# Patient Record
Sex: FEMALE | NOT HISPANIC OR LATINO
[De-identification: names, ages, dates, MRNs, and addresses within clinical notes are randomized per-mention and may not be internally consistent; named-entity substitution may affect disease eponyms.]

---

## 2017-01-18 ENCOUNTER — PRIOR ORIGINAL RECORDS (OUTPATIENT)
Dept: OTHER | Age: 67
End: 2017-01-18

## 2017-01-20 ENCOUNTER — PRIOR ORIGINAL RECORDS (OUTPATIENT)
Dept: OTHER | Age: 67
End: 2017-01-20

## 2017-01-23 ENCOUNTER — PRIOR ORIGINAL RECORDS (OUTPATIENT)
Dept: OTHER | Age: 67
End: 2017-01-23

## 2017-01-23 ENCOUNTER — LAB ENCOUNTER (OUTPATIENT)
Dept: LAB | Facility: HOSPITAL | Age: 67
End: 2017-01-23
Attending: INTERNAL MEDICINE
Payer: MEDICARE

## 2017-01-23 DIAGNOSIS — D69.6 THROMBOCYTOPENIA (HCC): ICD-10-CM

## 2017-01-23 DIAGNOSIS — C85.80 LARGE CELL LYMPHOMA (HCC): Primary | ICD-10-CM

## 2017-01-23 PROCEDURE — 88237 TISSUE CULTURE BONE MARROW: CPT

## 2017-01-23 PROCEDURE — 88271 CYTOGENETICS DNA PROBE: CPT

## 2017-01-23 PROCEDURE — 88313 SPECIAL STAINS GROUP 2: CPT

## 2017-01-23 PROCEDURE — 88275 CYTOGENETICS 100-300: CPT

## 2017-01-23 PROCEDURE — 88341 IMHCHEM/IMCYTCHM EA ADD ANTB: CPT

## 2017-01-23 PROCEDURE — 88305 TISSUE EXAM BY PATHOLOGIST: CPT

## 2017-01-23 PROCEDURE — 88291 CYTO/MOLECULAR REPORT: CPT

## 2017-01-23 PROCEDURE — 88342 IMHCHEM/IMCYTCHM 1ST ANTB: CPT

## 2017-01-23 PROCEDURE — 88184 FLOWCYTOMETRY/ TC 1 MARKER: CPT

## 2017-01-23 PROCEDURE — 88185 FLOWCYTOMETRY/TC ADD-ON: CPT

## 2017-01-23 PROCEDURE — 36415 COLL VENOUS BLD VENIPUNCTURE: CPT

## 2017-01-23 PROCEDURE — 88264 CHROMOSOME ANALYSIS 20-25: CPT

## 2017-01-23 PROCEDURE — 88311 DECALCIFY TISSUE: CPT

## 2017-01-23 PROCEDURE — 85097 BONE MARROW INTERPRETATION: CPT

## 2017-01-27 ENCOUNTER — PRIOR ORIGINAL RECORDS (OUTPATIENT)
Dept: OTHER | Age: 67
End: 2017-01-27

## 2017-01-30 ENCOUNTER — PRIOR ORIGINAL RECORDS (OUTPATIENT)
Dept: OTHER | Age: 67
End: 2017-01-30

## 2017-01-31 LAB
CD19+CD10+: 98.5 %
CD19+CD20+: 0.7 %
CD19+CD22+: 1 %
CD19+CD25+: <0.1 %
CD19+CD5+: <0.1 %
CD19+KAPPA+: 13.2 %
CD19+LAMBDA+: 6.9 %
CD3+CD2+: 99.9 %
CD3+CD4+: 42.8 %
CD3+CD4+CD8+: 1.8 %
CD3+CD5+: 94.6 %
CD3+CD7+: 79.1 %
CD3+CD8+: 35.3 %
CD4+:CD8+ RATIO: 1.2
KAPPA:LAMBDA RATIO: 1.91

## 2017-02-03 ENCOUNTER — PRIOR ORIGINAL RECORDS (OUTPATIENT)
Dept: OTHER | Age: 67
End: 2017-02-03

## 2017-02-03 PROBLEM — D69.3 IDIOPATHIC THROMBOCYTOPENIA PURPURA (HCC): Status: ACTIVE | Noted: 2017-02-03

## 2017-02-06 ENCOUNTER — OFFICE VISIT (OUTPATIENT)
Dept: HEMATOLOGY/ONCOLOGY | Facility: HOSPITAL | Age: 67
End: 2017-02-06
Attending: INTERNAL MEDICINE
Payer: MEDICARE

## 2017-02-06 VITALS
WEIGHT: 148.63 LBS | TEMPERATURE: 98 F | DIASTOLIC BLOOD PRESSURE: 65 MMHG | HEART RATE: 76 BPM | BODY MASS INDEX: 26 KG/M2 | SYSTOLIC BLOOD PRESSURE: 130 MMHG

## 2017-02-06 DIAGNOSIS — D69.3 IDIOPATHIC THROMBOCYTOPENIA PURPURA (HCC): Primary | ICD-10-CM

## 2017-02-06 PROCEDURE — 96366 THER/PROPH/DIAG IV INF ADDON: CPT

## 2017-02-06 PROCEDURE — 96365 THER/PROPH/DIAG IV INF INIT: CPT

## 2017-02-06 RX ORDER — ACETAMINOPHEN 325 MG/1
650 TABLET ORAL ONCE
Status: COMPLETED | OUTPATIENT
Start: 2017-02-06 | End: 2017-02-06

## 2017-02-06 RX ADMIN — ACETAMINOPHEN 650 MG: 325 TABLET ORAL at 10:07:00

## 2017-02-06 NOTE — PROGRESS NOTES
Education Record    Learner:  Patient    Disease / Carisa Honour    Barriers / Limitations:  None   Comments:    Method:  Discussion   Comments:    General Topics:  Plan of care reviewed   Comments:    Outcome:  Shows understanding   Comments:

## 2017-02-07 ENCOUNTER — OFFICE VISIT (OUTPATIENT)
Dept: HEMATOLOGY/ONCOLOGY | Facility: HOSPITAL | Age: 67
End: 2017-02-07
Attending: INTERNAL MEDICINE
Payer: MEDICARE

## 2017-02-07 VITALS
HEIGHT: 62.99 IN | BODY MASS INDEX: 26.29 KG/M2 | RESPIRATION RATE: 18 BRPM | HEART RATE: 86 BPM | OXYGEN SATURATION: 95 % | TEMPERATURE: 98 F | WEIGHT: 148.38 LBS | SYSTOLIC BLOOD PRESSURE: 116 MMHG | DIASTOLIC BLOOD PRESSURE: 60 MMHG

## 2017-02-07 DIAGNOSIS — D69.3 IDIOPATHIC THROMBOCYTOPENIA PURPURA (HCC): Primary | ICD-10-CM

## 2017-02-07 PROCEDURE — 96365 THER/PROPH/DIAG IV INF INIT: CPT

## 2017-02-07 PROCEDURE — 96366 THER/PROPH/DIAG IV INF ADDON: CPT

## 2017-02-07 RX ORDER — ACETAMINOPHEN 325 MG/1
650 TABLET ORAL ONCE
Status: COMPLETED | OUTPATIENT
Start: 2017-02-07 | End: 2017-02-07

## 2017-02-07 RX ADMIN — ACETAMINOPHEN 650 MG: 325 TABLET ORAL at 08:22:00

## 2017-02-07 NOTE — PROGRESS NOTES
Education Record    Learner:  Patient    Disease / Diagnosis:IVIG    Barriers / Limitations:  None   Comments:    Method:  Reinforcement   Comments:    General Topics:  Medication, Pain, Precautions, Procedure, Side effects and symptom management, Plan of

## 2017-02-10 ENCOUNTER — OFFICE VISIT (OUTPATIENT)
Dept: HEMATOLOGY/ONCOLOGY | Facility: HOSPITAL | Age: 67
End: 2017-02-10
Attending: INTERNAL MEDICINE
Payer: MEDICARE

## 2017-02-10 ENCOUNTER — PRIOR ORIGINAL RECORDS (OUTPATIENT)
Dept: OTHER | Age: 67
End: 2017-02-10

## 2017-02-10 VITALS
HEART RATE: 71 BPM | BODY MASS INDEX: 26.19 KG/M2 | RESPIRATION RATE: 20 BRPM | HEIGHT: 62.99 IN | DIASTOLIC BLOOD PRESSURE: 63 MMHG | OXYGEN SATURATION: 99 % | WEIGHT: 147.81 LBS | TEMPERATURE: 98 F | SYSTOLIC BLOOD PRESSURE: 118 MMHG

## 2017-02-10 DIAGNOSIS — D69.6 THROMBOCYTOPENIA (HCC): Primary | ICD-10-CM

## 2017-02-10 DIAGNOSIS — C83.08 SMALL CELL B-CELL LYMPHOMA OF LYMPH NODES OF MULTIPLE SITES (HCC): ICD-10-CM

## 2017-02-10 LAB
ANTIBODY SCREEN: NEGATIVE
PLATELET # BLD AUTO: 94 10(3)UL (ref 150–450)
RH BLOOD TYPE: POSITIVE

## 2017-02-10 PROCEDURE — 36430 TRANSFUSION BLD/BLD COMPNT: CPT

## 2017-02-10 PROCEDURE — 85049 AUTOMATED PLATELET COUNT: CPT

## 2017-02-10 PROCEDURE — 86900 BLOOD TYPING SEROLOGIC ABO: CPT

## 2017-02-10 PROCEDURE — 86901 BLOOD TYPING SEROLOGIC RH(D): CPT

## 2017-02-10 PROCEDURE — 86850 RBC ANTIBODY SCREEN: CPT

## 2017-02-10 RX ORDER — DIPHENHYDRAMINE HCL 25 MG
25 CAPSULE ORAL ONCE
Status: COMPLETED | OUTPATIENT
Start: 2017-02-10 | End: 2017-02-10

## 2017-02-10 RX ORDER — ACETAMINOPHEN 325 MG/1
650 TABLET ORAL ONCE
Status: COMPLETED | OUTPATIENT
Start: 2017-02-10 | End: 2017-02-10

## 2017-02-10 RX ADMIN — ACETAMINOPHEN 650 MG: 325 TABLET ORAL at 12:11:00

## 2017-02-10 RX ADMIN — DIPHENHYDRAMINE HCL 25 MG: 25 MG CAPSULE ORAL at 12:11:00

## 2017-02-10 NOTE — PROGRESS NOTES
Education Record    Learner:  Patient    Disease / Diagnosis:  Thrombocytopenia    Barriers / Limitations:  None   Comments:    Method:  Discussion   Comments:    General Topics:  Medication, Precautions, Procedure and Plan of care reviewed   Comments:    O

## 2017-02-11 LAB — BLOOD TYPE BARCODE: 6200

## 2017-02-13 ENCOUNTER — PRIOR ORIGINAL RECORDS (OUTPATIENT)
Dept: OTHER | Age: 67
End: 2017-02-13

## 2017-02-17 ENCOUNTER — PRIOR ORIGINAL RECORDS (OUTPATIENT)
Dept: OTHER | Age: 67
End: 2017-02-17

## 2017-02-24 ENCOUNTER — PRIOR ORIGINAL RECORDS (OUTPATIENT)
Dept: OTHER | Age: 67
End: 2017-02-24

## 2017-02-24 ENCOUNTER — OFFICE VISIT (OUTPATIENT)
Dept: HEMATOLOGY/ONCOLOGY | Facility: HOSPITAL | Age: 67
End: 2017-02-24
Attending: INTERNAL MEDICINE
Payer: MEDICARE

## 2017-02-24 VITALS
RESPIRATION RATE: 18 BRPM | DIASTOLIC BLOOD PRESSURE: 59 MMHG | HEART RATE: 71 BPM | TEMPERATURE: 98 F | SYSTOLIC BLOOD PRESSURE: 120 MMHG

## 2017-02-24 DIAGNOSIS — C83.08 SMALL CELL B-CELL LYMPHOMA OF LYMPH NODES OF MULTIPLE SITES (HCC): ICD-10-CM

## 2017-02-24 DIAGNOSIS — C85.88 LYMPHOSARCOMA OF LYMPH NODES OF MULTIPLE SITES (HCC): ICD-10-CM

## 2017-02-24 DIAGNOSIS — D69.6 THROMBOCYTOPENIA (HCC): Primary | ICD-10-CM

## 2017-02-24 LAB
ANTIBODY SCREEN: NEGATIVE
RH BLOOD TYPE: POSITIVE

## 2017-02-24 PROCEDURE — 86901 BLOOD TYPING SEROLOGIC RH(D): CPT

## 2017-02-24 PROCEDURE — 86900 BLOOD TYPING SEROLOGIC ABO: CPT

## 2017-02-24 PROCEDURE — 86850 RBC ANTIBODY SCREEN: CPT

## 2017-02-24 PROCEDURE — 36430 TRANSFUSION BLD/BLD COMPNT: CPT

## 2017-02-24 RX ORDER — GARLIC EXTRACT 500 MG
1 CAPSULE ORAL DAILY
COMMUNITY
End: 2017-05-10

## 2017-02-24 RX ORDER — DIPHENHYDRAMINE HCL 25 MG
25 CAPSULE ORAL ONCE
Status: COMPLETED | OUTPATIENT
Start: 2017-02-24 | End: 2017-02-24

## 2017-02-24 RX ORDER — ACETAMINOPHEN 325 MG/1
650 TABLET ORAL ONCE
Status: COMPLETED | OUTPATIENT
Start: 2017-02-24 | End: 2017-02-24

## 2017-02-24 RX ORDER — DOXEPIN HYDROCHLORIDE 50 MG/1
1 CAPSULE ORAL DAILY
COMMUNITY
End: 2017-06-29

## 2017-02-24 RX ADMIN — DIPHENHYDRAMINE HCL 25 MG: 25 MG CAPSULE ORAL at 11:41:00

## 2017-02-24 RX ADMIN — ACETAMINOPHEN 650 MG: 325 TABLET ORAL at 11:41:00

## 2017-02-24 NOTE — PROGRESS NOTES
Education Record    Learner:  Patient    Disease / Diagnosis: 1 unit platelets    Barriers / Limitations:  None   Comments:    Method:  Discussion   Comments:    General Topics:  Side effects and symptom management and Plan of care reviewed   Comments:

## 2017-02-25 LAB — BLOOD TYPE BARCODE: 6200

## 2017-02-28 ENCOUNTER — PRIOR ORIGINAL RECORDS (OUTPATIENT)
Dept: OTHER | Age: 67
End: 2017-02-28

## 2017-03-03 ENCOUNTER — PRIOR ORIGINAL RECORDS (OUTPATIENT)
Dept: OTHER | Age: 67
End: 2017-03-03

## 2017-03-10 ENCOUNTER — PRIOR ORIGINAL RECORDS (OUTPATIENT)
Dept: OTHER | Age: 67
End: 2017-03-10

## 2017-03-15 ENCOUNTER — PRIOR ORIGINAL RECORDS (OUTPATIENT)
Dept: OTHER | Age: 67
End: 2017-03-15

## 2017-03-24 ENCOUNTER — PRIOR ORIGINAL RECORDS (OUTPATIENT)
Dept: OTHER | Age: 67
End: 2017-03-24

## 2017-03-31 ENCOUNTER — PRIOR ORIGINAL RECORDS (OUTPATIENT)
Dept: OTHER | Age: 67
End: 2017-03-31

## 2017-04-06 ENCOUNTER — HOSPITAL ENCOUNTER (OUTPATIENT)
Dept: MAMMOGRAPHY | Age: 67
Discharge: HOME | End: 2017-04-06
Attending: INTERNAL MEDICINE
Payer: MEDICARE

## 2017-04-06 DIAGNOSIS — Z12.31 ENCOUNTER FOR SCREENING MAMMOGRAM FOR MALIGNANT NEOPLASM OF BREAST: ICD-10-CM

## 2017-04-06 DIAGNOSIS — Z12.31 VISIT FOR SCREENING MAMMOGRAM: ICD-10-CM

## 2017-04-06 PROCEDURE — 77067 SCR MAMMO BI INCL CAD: CPT

## 2017-04-07 ENCOUNTER — PRIOR ORIGINAL RECORDS (OUTPATIENT)
Dept: OTHER | Age: 67
End: 2017-04-07

## 2017-04-12 ENCOUNTER — PRIOR ORIGINAL RECORDS (OUTPATIENT)
Dept: OTHER | Age: 67
End: 2017-04-12

## 2017-04-13 ENCOUNTER — HOSPITAL ENCOUNTER (OUTPATIENT)
Dept: MAMMOGRAPHY | Facility: HOSPITAL | Age: 67
Discharge: HOME | End: 2017-04-13
Attending: INTERNAL MEDICINE
Payer: MEDICARE

## 2017-04-13 DIAGNOSIS — R92.2 INCONCLUSIVE MAMMOGRAM: ICD-10-CM

## 2017-04-13 PROCEDURE — 77065 DX MAMMO INCL CAD UNI: CPT

## 2017-04-13 PROCEDURE — 77061 BREAST TOMOSYNTHESIS UNI: CPT

## 2017-04-13 PROCEDURE — 76642 ULTRASOUND BREAST LIMITED: CPT

## 2017-04-13 NOTE — IMAGING NOTE
Asssisted Dr. Juan Luis Freitas with recommendation for a left retroareolar US breast biopsy for nodule. Emotional and educational suport provided. Written information provided to Angi Loya.  Our breast center schedulers will call pt within 72 hours to

## 2017-04-14 ENCOUNTER — TELEPHONE (OUTPATIENT)
Dept: MAMMOGRAPHY | Facility: HOSPITAL | Age: 67
End: 2017-04-14

## 2017-04-18 ENCOUNTER — PRIOR ORIGINAL RECORDS (OUTPATIENT)
Dept: OTHER | Age: 67
End: 2017-04-18

## 2017-04-20 ENCOUNTER — HOSPITAL ENCOUNTER (OUTPATIENT)
Dept: MAMMOGRAPHY | Facility: HOSPITAL | Age: 67
Discharge: HOME | End: 2017-04-20
Attending: INTERNAL MEDICINE
Payer: MEDICARE

## 2017-04-20 DIAGNOSIS — N63.0 BREAST NODULE: ICD-10-CM

## 2017-04-20 PROCEDURE — 77065 DX MAMMO INCL CAD UNI: CPT

## 2017-04-20 PROCEDURE — 19083 BX BREAST 1ST LESION US IMAG: CPT

## 2017-04-20 PROCEDURE — 88305 TISSUE EXAM BY PATHOLOGIST: CPT | Performed by: INTERNAL MEDICINE

## 2017-04-20 RX ORDER — ACETAMINOPHEN 500 MG
1000 TABLET ORAL ONCE
Status: COMPLETED | OUTPATIENT
Start: 2017-04-20 | End: 2017-04-20

## 2017-04-20 RX ORDER — ACETAMINOPHEN 500 MG
TABLET ORAL
Status: COMPLETED
Start: 2017-04-20 | End: 2017-04-20

## 2017-04-20 RX ADMIN — ACETAMINOPHEN 1000 MG: 500 MG TABLET ORAL at 13:15:00

## 2017-04-20 NOTE — PROCEDURES
PROCEDURE: ULTRASOUND GUIDED BIOPSY OF THE LEFT BREAST, ONE SITE    COMPARISON: US EULA BREAST LEFT LIMITED ([facility] SAME DAY US)(CPT=76642), 4/13/2017, 14:02.     INDICATIONS: N63 Unspecified lump in breast, evaluate mass in the left breast are biopsy

## 2017-04-20 NOTE — IMAGING NOTE
Procedure explained throughout and all questions answered. Consent and discharge paperwork signed by Luna Mahoney. Left breast positioned. Assisted Dr. Bobby Mclean with US guided biopsy.  Pt tolerated well. Pressure held on biopsy site for 10 min.  April Rendon

## 2017-04-24 ENCOUNTER — TELEPHONE (OUTPATIENT)
Dept: MAMMOGRAPHY | Facility: HOSPITAL | Age: 67
End: 2017-04-24

## 2017-05-05 ENCOUNTER — PRIOR ORIGINAL RECORDS (OUTPATIENT)
Dept: OTHER | Age: 67
End: 2017-05-05

## 2017-05-19 ENCOUNTER — PRIOR ORIGINAL RECORDS (OUTPATIENT)
Dept: OTHER | Age: 67
End: 2017-05-19

## 2017-06-10 ENCOUNTER — APPOINTMENT (OUTPATIENT)
Dept: GENERAL RADIOLOGY | Facility: HOSPITAL | Age: 67
End: 2017-06-10
Attending: EMERGENCY MEDICINE
Payer: MEDICARE

## 2017-06-10 ENCOUNTER — HOSPITAL ENCOUNTER (EMERGENCY)
Facility: HOSPITAL | Age: 67
Discharge: HOME | End: 2017-06-10
Attending: EMERGENCY MEDICINE
Payer: MEDICARE

## 2017-06-10 ENCOUNTER — APPOINTMENT (OUTPATIENT)
Dept: ULTRASOUND IMAGING | Facility: HOSPITAL | Age: 67
End: 2017-06-10
Attending: EMERGENCY MEDICINE
Payer: MEDICARE

## 2017-06-10 VITALS
HEART RATE: 74 BPM | OXYGEN SATURATION: 100 % | DIASTOLIC BLOOD PRESSURE: 82 MMHG | BODY MASS INDEX: 29.44 KG/M2 | HEIGHT: 62 IN | RESPIRATION RATE: 16 BRPM | TEMPERATURE: 98 F | WEIGHT: 160 LBS | SYSTOLIC BLOOD PRESSURE: 125 MMHG

## 2017-06-10 DIAGNOSIS — D46.9 MYELODYSPLASIA (MYELODYSPLASTIC SYNDROME) (HCC): ICD-10-CM

## 2017-06-10 DIAGNOSIS — D69.6 THROMBOCYTOPENIA (HCC): Primary | ICD-10-CM

## 2017-06-10 PROCEDURE — 36415 COLL VENOUS BLD VENIPUNCTURE: CPT

## 2017-06-10 PROCEDURE — 80053 COMPREHEN METABOLIC PANEL: CPT | Performed by: EMERGENCY MEDICINE

## 2017-06-10 PROCEDURE — 71020 XR CHEST PA + LAT CHEST (CPT=71020): CPT | Performed by: EMERGENCY MEDICINE

## 2017-06-10 PROCEDURE — 99284 EMERGENCY DEPT VISIT MOD MDM: CPT

## 2017-06-10 PROCEDURE — 81003 URINALYSIS AUTO W/O SCOPE: CPT | Performed by: EMERGENCY MEDICINE

## 2017-06-10 PROCEDURE — 99285 EMERGENCY DEPT VISIT HI MDM: CPT

## 2017-06-10 PROCEDURE — 86901 BLOOD TYPING SEROLOGIC RH(D): CPT | Performed by: EMERGENCY MEDICINE

## 2017-06-10 PROCEDURE — 93970 EXTREMITY STUDY: CPT | Performed by: EMERGENCY MEDICINE

## 2017-06-10 PROCEDURE — 86850 RBC ANTIBODY SCREEN: CPT | Performed by: EMERGENCY MEDICINE

## 2017-06-10 PROCEDURE — 85025 COMPLETE CBC W/AUTO DIFF WBC: CPT | Performed by: EMERGENCY MEDICINE

## 2017-06-10 PROCEDURE — 86900 BLOOD TYPING SEROLOGIC ABO: CPT | Performed by: EMERGENCY MEDICINE

## 2017-06-10 PROCEDURE — 87040 BLOOD CULTURE FOR BACTERIA: CPT | Performed by: EMERGENCY MEDICINE

## 2017-06-10 PROCEDURE — 36430 TRANSFUSION BLD/BLD COMPNT: CPT

## 2017-06-10 NOTE — ED NOTES
Ok to give hep lock flush per Dr. Daniela Reyna. Port a cath deaccessed after 10ml saline flush and 500unit heparin lock flush.

## 2017-06-10 NOTE — ED PROVIDER NOTES
Patient Seen in: [facility] Emergency Department    History   Patient presents with:  Rash Skin Problem (integumentary)    Stated Complaint: wound not healing    HPI    Patient presents with swelling and redness of her right ankle region for the pas needed for Wheezing. acyclovir 200 MG Oral Cap,  Take 200 mg by mouth 2 (two) times daily.          Family History   Problem Relation Age of Onset   • alzheimer's disease[other] [OTHER] Mother    • Cancer Father      lung   • Stroke Maternal Grandmother adenopathy. Neurological: She is alert and oriented to person, place, and time. She has normal strength. No sensory deficit. Skin: Skin is warm and dry.    There is milde generalized edema in the area of the patient's right ankle along with some mild te BLOOD TYPE, ABO AND RH C[[id number]]                         Final result               ANTIBODY SCREEN[[id number]]                                  Final result                 Please view results for these tests on the individual orders.    BLOOD TYPE, AB

## 2017-06-15 ENCOUNTER — PRIOR ORIGINAL RECORDS (OUTPATIENT)
Dept: OTHER | Age: 67
End: 2017-06-15

## 2017-06-15 ENCOUNTER — LAB ENCOUNTER (OUTPATIENT)
Dept: LAB | Facility: HOSPITAL | Age: 67
End: 2017-06-15
Attending: INTERNAL MEDICINE
Payer: MEDICARE

## 2017-06-15 DIAGNOSIS — C83.08 SMALL CELL B-CELL LYMPHOMA OF LYMPH NODES OF MULTIPLE SITES (HCC): Primary | ICD-10-CM

## 2017-06-15 PROCEDURE — 88311 DECALCIFY TISSUE: CPT

## 2017-06-15 PROCEDURE — 88184 FLOWCYTOMETRY/ TC 1 MARKER: CPT

## 2017-06-15 PROCEDURE — 88313 SPECIAL STAINS GROUP 2: CPT

## 2017-06-15 PROCEDURE — 85097 BONE MARROW INTERPRETATION: CPT

## 2017-06-15 PROCEDURE — 88185 FLOWCYTOMETRY/TC ADD-ON: CPT

## 2017-06-15 PROCEDURE — 88342 IMHCHEM/IMCYTCHM 1ST ANTB: CPT

## 2017-06-15 PROCEDURE — 88264 CHROMOSOME ANALYSIS 20-25: CPT

## 2017-06-15 PROCEDURE — 88305 TISSUE EXAM BY PATHOLOGIST: CPT

## 2017-06-15 PROCEDURE — 36415 COLL VENOUS BLD VENIPUNCTURE: CPT

## 2017-06-15 PROCEDURE — 88341 IMHCHEM/IMCYTCHM EA ADD ANTB: CPT

## 2017-06-15 PROCEDURE — 88237 TISSUE CULTURE BONE MARROW: CPT

## 2017-06-16 PROCEDURE — 88175 CYTOPATH C/V AUTO FLUID REDO: CPT | Performed by: INTERNAL MEDICINE

## 2017-06-19 ENCOUNTER — PRIOR ORIGINAL RECORDS (OUTPATIENT)
Dept: OTHER | Age: 67
End: 2017-06-19

## 2017-06-19 PROBLEM — M41.25 OTHER IDIOPATHIC SCOLIOSIS, THORACOLUMBAR REGION: Status: ACTIVE | Noted: 2017-06-19

## 2017-06-20 ENCOUNTER — OFFICE VISIT (OUTPATIENT)
Dept: HEMATOLOGY/ONCOLOGY | Facility: HOSPITAL | Age: 67
End: 2017-06-20
Attending: INTERNAL MEDICINE
Payer: MEDICARE

## 2017-06-20 VITALS
RESPIRATION RATE: 18 BRPM | WEIGHT: 157.5 LBS | TEMPERATURE: 98 F | BODY MASS INDEX: 27.91 KG/M2 | DIASTOLIC BLOOD PRESSURE: 65 MMHG | HEART RATE: 76 BPM | HEIGHT: 62.99 IN | SYSTOLIC BLOOD PRESSURE: 114 MMHG | OXYGEN SATURATION: 99 %

## 2017-06-20 DIAGNOSIS — Z51.11 ENCOUNTER FOR ANTINEOPLASTIC CHEMOTHERAPY: ICD-10-CM

## 2017-06-20 DIAGNOSIS — D64.89 ANEMIA DUE TO OTHER CAUSE: ICD-10-CM

## 2017-06-20 DIAGNOSIS — D69.3 IDIOPATHIC THROMBOCYTOPENIA PURPURA (HCC): ICD-10-CM

## 2017-06-20 DIAGNOSIS — C83.08 SMALL CELL B-CELL LYMPHOMA OF LYMPH NODES OF MULTIPLE SITES (HCC): ICD-10-CM

## 2017-06-20 DIAGNOSIS — C85.88 LYMPHOSARCOMA OF LYMPH NODES OF MULTIPLE SITES (HCC): ICD-10-CM

## 2017-06-20 DIAGNOSIS — D69.6 THROMBOCYTOPENIA (HCC): Primary | ICD-10-CM

## 2017-06-20 PROCEDURE — 36430 TRANSFUSION BLD/BLD COMPNT: CPT

## 2017-06-20 PROCEDURE — 86850 RBC ANTIBODY SCREEN: CPT

## 2017-06-20 PROCEDURE — 86900 BLOOD TYPING SEROLOGIC ABO: CPT

## 2017-06-20 PROCEDURE — 36415 COLL VENOUS BLD VENIPUNCTURE: CPT

## 2017-06-20 PROCEDURE — 86901 BLOOD TYPING SEROLOGIC RH(D): CPT

## 2017-06-20 RX ORDER — SODIUM CHLORIDE 0.9 % (FLUSH) 0.9 %
10 SYRINGE (ML) INJECTION ONCE
Status: COMPLETED | OUTPATIENT
Start: 2017-06-20 | End: 2017-06-20

## 2017-06-20 RX ORDER — ACETAMINOPHEN 325 MG/1
650 TABLET ORAL ONCE
Status: COMPLETED | OUTPATIENT
Start: 2017-06-20 | End: 2017-06-20

## 2017-06-20 RX ORDER — DIPHENHYDRAMINE HCL 25 MG
25 CAPSULE ORAL ONCE
Status: COMPLETED | OUTPATIENT
Start: 2017-06-20 | End: 2017-06-20

## 2017-06-20 RX ADMIN — DIPHENHYDRAMINE HCL 25 MG: 25 MG CAPSULE ORAL at 14:10:00

## 2017-06-20 RX ADMIN — SODIUM CHLORIDE 0.9 % (FLUSH) 10 ML: 0.9 % SYRINGE (ML) INJECTION at 15:35:00

## 2017-06-20 RX ADMIN — ACETAMINOPHEN 650 MG: 325 TABLET ORAL at 14:10:00

## 2017-06-20 NOTE — PROGRESS NOTES
Pt arrived for plt transfusion, pt states sees Dr Jose Mckeon and was called with low plt and told to come to cancer center for transfusion of 1 unit, pt alert and appears in nad, pt ambulates with steady gait, post transfusion instructions with MD phone number

## 2017-06-23 ENCOUNTER — PRIOR ORIGINAL RECORDS (OUTPATIENT)
Dept: OTHER | Age: 67
End: 2017-06-23

## 2017-06-26 ENCOUNTER — PRIOR ORIGINAL RECORDS (OUTPATIENT)
Dept: OTHER | Age: 67
End: 2017-06-26

## 2017-06-27 ENCOUNTER — PRIOR ORIGINAL RECORDS (OUTPATIENT)
Dept: OTHER | Age: 67
End: 2017-06-27

## 2017-06-28 ENCOUNTER — NURSE ONLY (OUTPATIENT)
Dept: HEMATOLOGY/ONCOLOGY | Facility: HOSPITAL | Age: 67
End: 2017-06-28
Attending: INTERNAL MEDICINE
Payer: MEDICARE

## 2017-06-28 ENCOUNTER — PRIOR ORIGINAL RECORDS (OUTPATIENT)
Dept: OTHER | Age: 67
End: 2017-06-28

## 2017-06-28 DIAGNOSIS — D64.89 ANEMIA DUE TO OTHER CAUSE: ICD-10-CM

## 2017-06-28 DIAGNOSIS — Z51.11 ENCOUNTER FOR ANTINEOPLASTIC CHEMOTHERAPY: ICD-10-CM

## 2017-06-28 DIAGNOSIS — C83.08 SMALL CELL B-CELL LYMPHOMA OF LYMPH NODES OF MULTIPLE SITES (HCC): ICD-10-CM

## 2017-06-28 DIAGNOSIS — C85.88 LYMPHOSARCOMA OF LYMPH NODES OF MULTIPLE SITES (HCC): ICD-10-CM

## 2017-06-28 DIAGNOSIS — D69.6 THROMBOCYTOPENIA (HCC): ICD-10-CM

## 2017-06-28 DIAGNOSIS — D69.6 THROMBOCYTOPENIA (HCC): Primary | ICD-10-CM

## 2017-06-28 PROCEDURE — 86901 BLOOD TYPING SEROLOGIC RH(D): CPT

## 2017-06-28 PROCEDURE — 86850 RBC ANTIBODY SCREEN: CPT

## 2017-06-28 PROCEDURE — 86900 BLOOD TYPING SEROLOGIC ABO: CPT

## 2017-06-28 PROCEDURE — 36415 COLL VENOUS BLD VENIPUNCTURE: CPT

## 2017-06-28 RX ORDER — ACETAMINOPHEN 325 MG/1
650 TABLET ORAL ONCE
Status: CANCELLED | OUTPATIENT
Start: 2017-06-28

## 2017-06-28 RX ORDER — DIPHENHYDRAMINE HCL 25 MG
25 CAPSULE ORAL ONCE
Status: CANCELLED | OUTPATIENT
Start: 2017-06-28

## 2017-06-29 ENCOUNTER — OFFICE VISIT (OUTPATIENT)
Dept: HEMATOLOGY/ONCOLOGY | Facility: HOSPITAL | Age: 67
End: 2017-06-29
Attending: INTERNAL MEDICINE
Payer: MEDICARE

## 2017-06-29 VITALS
HEIGHT: 62.99 IN | BODY MASS INDEX: 28 KG/M2 | OXYGEN SATURATION: 96 % | TEMPERATURE: 99 F | DIASTOLIC BLOOD PRESSURE: 56 MMHG | HEART RATE: 75 BPM | SYSTOLIC BLOOD PRESSURE: 98 MMHG | WEIGHT: 158 LBS | RESPIRATION RATE: 18 BRPM

## 2017-06-29 DIAGNOSIS — C85.88 LYMPHOSARCOMA OF LYMPH NODES OF MULTIPLE SITES (HCC): ICD-10-CM

## 2017-06-29 DIAGNOSIS — D69.6 THROMBOCYTOPENIA (HCC): Primary | ICD-10-CM

## 2017-06-29 DIAGNOSIS — D69.3 IDIOPATHIC THROMBOCYTOPENIA PURPURA (HCC): ICD-10-CM

## 2017-06-29 DIAGNOSIS — D64.89 ANEMIA DUE TO OTHER CAUSE: ICD-10-CM

## 2017-06-29 DIAGNOSIS — Z51.11 ENCOUNTER FOR ANTINEOPLASTIC CHEMOTHERAPY: ICD-10-CM

## 2017-06-29 DIAGNOSIS — C83.08 SMALL CELL B-CELL LYMPHOMA OF LYMPH NODES OF MULTIPLE SITES (HCC): ICD-10-CM

## 2017-06-29 PROCEDURE — 36430 TRANSFUSION BLD/BLD COMPNT: CPT

## 2017-06-29 RX ORDER — SODIUM CHLORIDE 0.9 % (FLUSH) 0.9 %
10 SYRINGE (ML) INJECTION ONCE
Status: CANCELLED | OUTPATIENT
Start: 2017-06-29

## 2017-06-29 RX ORDER — ACETAMINOPHEN 325 MG/1
650 TABLET ORAL ONCE
Status: COMPLETED | OUTPATIENT
Start: 2017-06-29 | End: 2017-06-29

## 2017-06-29 RX ORDER — ACETAMINOPHEN 325 MG/1
650 TABLET ORAL ONCE
Status: CANCELLED | OUTPATIENT
Start: 2017-06-29

## 2017-06-29 RX ORDER — SODIUM CHLORIDE 0.9 % (FLUSH) 0.9 %
10 SYRINGE (ML) INJECTION ONCE
Status: COMPLETED | OUTPATIENT
Start: 2017-06-29 | End: 2017-06-29

## 2017-06-29 RX ORDER — DIPHENHYDRAMINE HCL 25 MG
25 CAPSULE ORAL ONCE
Status: CANCELLED | OUTPATIENT
Start: 2017-06-29

## 2017-06-29 RX ORDER — DIPHENHYDRAMINE HYDROCHLORIDE 50 MG/ML
25 INJECTION INTRAMUSCULAR; INTRAVENOUS AS NEEDED
Status: CANCELLED | OUTPATIENT
Start: 2017-06-29

## 2017-06-29 RX ORDER — DIPHENHYDRAMINE HYDROCHLORIDE 50 MG/ML
50 INJECTION INTRAMUSCULAR; INTRAVENOUS AS NEEDED
Status: CANCELLED | OUTPATIENT
Start: 2017-06-29

## 2017-06-29 RX ORDER — ONDANSETRON HYDROCHLORIDE 8 MG/1
8 TABLET, FILM COATED ORAL EVERY 8 HOURS PRN
COMMUNITY

## 2017-06-29 RX ORDER — DIPHENHYDRAMINE HCL 25 MG
25 CAPSULE ORAL ONCE
Status: COMPLETED | OUTPATIENT
Start: 2017-06-29 | End: 2017-06-29

## 2017-06-29 RX ADMIN — SODIUM CHLORIDE 0.9 % (FLUSH) 10 ML: 0.9 % SYRINGE (ML) INJECTION at 15:25:00

## 2017-06-29 RX ADMIN — ACETAMINOPHEN 650 MG: 325 TABLET ORAL at 14:05:00

## 2017-06-29 RX ADMIN — DIPHENHYDRAMINE HCL 25 MG: 25 MG CAPSULE ORAL at 14:05:00

## 2017-06-29 NOTE — PATIENT INSTRUCTIONS
YOU RECEIVED 1 UNIT OF PLATELETS FOR A PLATELET COUNT OF [phone], PER 'S ORDERS. Post Transfusion Instructions for Out-Patients    Most recipients of blood transfusions do not experience any adverse effects.   You may resume your normal activities

## 2017-06-30 ENCOUNTER — PRIOR ORIGINAL RECORDS (OUTPATIENT)
Dept: OTHER | Age: 67
End: 2017-06-30

## 2017-07-03 ENCOUNTER — PRIOR ORIGINAL RECORDS (OUTPATIENT)
Dept: OTHER | Age: 67
End: 2017-07-03

## 2017-07-06 ENCOUNTER — NURSE ONLY (OUTPATIENT)
Dept: HEMATOLOGY/ONCOLOGY | Facility: HOSPITAL | Age: 67
End: 2017-07-06
Attending: INTERNAL MEDICINE
Payer: MEDICARE

## 2017-07-06 ENCOUNTER — PRIOR ORIGINAL RECORDS (OUTPATIENT)
Dept: OTHER | Age: 67
End: 2017-07-06

## 2017-07-06 VITALS
TEMPERATURE: 99 F | HEART RATE: 80 BPM | DIASTOLIC BLOOD PRESSURE: 73 MMHG | OXYGEN SATURATION: 96 % | SYSTOLIC BLOOD PRESSURE: 122 MMHG | RESPIRATION RATE: 20 BRPM

## 2017-07-06 DIAGNOSIS — C83.08 SMALL CELL B-CELL LYMPHOMA OF LYMPH NODES OF MULTIPLE SITES (HCC): ICD-10-CM

## 2017-07-06 DIAGNOSIS — Z51.11 ENCOUNTER FOR ANTINEOPLASTIC CHEMOTHERAPY: ICD-10-CM

## 2017-07-06 DIAGNOSIS — D69.6 THROMBOCYTOPENIA (HCC): Primary | ICD-10-CM

## 2017-07-06 PROBLEM — R29.898 WEAKNESS OF BOTH LEGS: Status: ACTIVE | Noted: 2017-07-06

## 2017-07-06 PROBLEM — M54.16 LUMBAR RADICULITIS: Status: ACTIVE | Noted: 2017-07-06

## 2017-07-06 LAB
ANTIBODY SCREEN: NEGATIVE
RH BLOOD TYPE: POSITIVE

## 2017-07-06 PROCEDURE — 86901 BLOOD TYPING SEROLOGIC RH(D): CPT

## 2017-07-06 PROCEDURE — 86850 RBC ANTIBODY SCREEN: CPT

## 2017-07-06 PROCEDURE — 86900 BLOOD TYPING SEROLOGIC ABO: CPT

## 2017-07-06 PROCEDURE — 36430 TRANSFUSION BLD/BLD COMPNT: CPT

## 2017-07-06 RX ORDER — ACETAMINOPHEN 325 MG/1
650 TABLET ORAL ONCE
Status: CANCELLED | OUTPATIENT
Start: 2017-07-06

## 2017-07-06 RX ORDER — ACETAMINOPHEN 325 MG/1
650 TABLET ORAL ONCE
Status: DISCONTINUED | OUTPATIENT
Start: 2017-07-06 | End: 2017-07-06

## 2017-07-06 RX ORDER — DIPHENHYDRAMINE HCL 25 MG
25 CAPSULE ORAL ONCE
Status: CANCELLED | OUTPATIENT
Start: 2017-07-06

## 2017-07-06 RX ORDER — DIPHENHYDRAMINE HCL 25 MG
25 CAPSULE ORAL ONCE
Status: DISCONTINUED | OUTPATIENT
Start: 2017-07-06 | End: 2017-07-06

## 2017-07-06 RX ADMIN — DIPHENHYDRAMINE HCL 25 MG: 25 MG CAPSULE ORAL at 13:45:00

## 2017-07-06 RX ADMIN — ACETAMINOPHEN 650 MG: 325 TABLET ORAL at 13:45:00

## 2017-07-10 ENCOUNTER — PRIOR ORIGINAL RECORDS (OUTPATIENT)
Dept: OTHER | Age: 67
End: 2017-07-10

## 2017-07-13 ENCOUNTER — OFFICE VISIT (OUTPATIENT)
Dept: HEMATOLOGY/ONCOLOGY | Facility: HOSPITAL | Age: 67
End: 2017-07-13
Attending: INTERNAL MEDICINE
Payer: MEDICARE

## 2017-07-13 ENCOUNTER — PRIOR ORIGINAL RECORDS (OUTPATIENT)
Dept: OTHER | Age: 67
End: 2017-07-13

## 2017-07-13 VITALS
OXYGEN SATURATION: 97 % | TEMPERATURE: 98 F | HEART RATE: 81 BPM | BODY MASS INDEX: 27.64 KG/M2 | DIASTOLIC BLOOD PRESSURE: 64 MMHG | WEIGHT: 156 LBS | RESPIRATION RATE: 20 BRPM | HEIGHT: 62.99 IN | SYSTOLIC BLOOD PRESSURE: 110 MMHG

## 2017-07-13 DIAGNOSIS — Z51.11 ENCOUNTER FOR ANTINEOPLASTIC CHEMOTHERAPY: ICD-10-CM

## 2017-07-13 DIAGNOSIS — D69.3 IDIOPATHIC THROMBOCYTOPENIA PURPURA (HCC): ICD-10-CM

## 2017-07-13 DIAGNOSIS — D69.6 THROMBOCYTOPENIA (HCC): Primary | ICD-10-CM

## 2017-07-13 DIAGNOSIS — C83.08 SMALL CELL B-CELL LYMPHOMA OF LYMPH NODES OF MULTIPLE SITES (HCC): ICD-10-CM

## 2017-07-13 DIAGNOSIS — C85.88 LYMPHOSARCOMA OF LYMPH NODES OF MULTIPLE SITES (HCC): ICD-10-CM

## 2017-07-13 LAB
ANTIBODY SCREEN: NEGATIVE
RH BLOOD TYPE: POSITIVE

## 2017-07-13 PROCEDURE — 86900 BLOOD TYPING SEROLOGIC ABO: CPT

## 2017-07-13 PROCEDURE — 36415 COLL VENOUS BLD VENIPUNCTURE: CPT

## 2017-07-13 PROCEDURE — 36430 TRANSFUSION BLD/BLD COMPNT: CPT

## 2017-07-13 PROCEDURE — 86850 RBC ANTIBODY SCREEN: CPT

## 2017-07-13 PROCEDURE — 86901 BLOOD TYPING SEROLOGIC RH(D): CPT

## 2017-07-13 RX ORDER — DIPHENHYDRAMINE HCL 25 MG
25 CAPSULE ORAL ONCE
Status: COMPLETED | OUTPATIENT
Start: 2017-07-13 | End: 2017-07-13

## 2017-07-13 RX ORDER — SODIUM CHLORIDE 0.9 % (FLUSH) 0.9 %
10 SYRINGE (ML) INJECTION ONCE
Status: DISCONTINUED | OUTPATIENT
Start: 2017-07-13 | End: 2017-07-13

## 2017-07-13 RX ORDER — SODIUM CHLORIDE 0.9 % (FLUSH) 0.9 %
10 SYRINGE (ML) INJECTION ONCE
Status: CANCELLED | OUTPATIENT
Start: 2017-07-13

## 2017-07-13 RX ORDER — DIPHENHYDRAMINE HYDROCHLORIDE 50 MG/ML
50 INJECTION INTRAMUSCULAR; INTRAVENOUS AS NEEDED
Status: CANCELLED | OUTPATIENT
Start: 2017-07-13

## 2017-07-13 RX ORDER — DIPHENHYDRAMINE HYDROCHLORIDE 50 MG/ML
25 INJECTION INTRAMUSCULAR; INTRAVENOUS AS NEEDED
Status: CANCELLED | OUTPATIENT
Start: 2017-07-13

## 2017-07-13 RX ORDER — ACETAMINOPHEN 325 MG/1
650 TABLET ORAL ONCE
Status: COMPLETED | OUTPATIENT
Start: 2017-07-13 | End: 2017-07-13

## 2017-07-13 RX ADMIN — DIPHENHYDRAMINE HCL 25 MG: 25 MG CAPSULE ORAL at 14:00:00

## 2017-07-13 RX ADMIN — ACETAMINOPHEN 650 MG: 325 TABLET ORAL at 14:00:00

## 2017-07-13 NOTE — PROGRESS NOTES
Education Record    Learner:  Patient    Disease / Diagnosis: Thrombocytopenia    Barriers / Limitations:  None   Comments:    Method:  Discussion   Comments:    General Topics:  Plan of care reviewed   Comments:    Outcome:  Shows understanding   Comments:

## 2017-07-14 LAB — BLOOD TYPE BARCODE: 5100

## 2017-07-17 ENCOUNTER — PRIOR ORIGINAL RECORDS (OUTPATIENT)
Dept: OTHER | Age: 67
End: 2017-07-17

## 2017-07-24 ENCOUNTER — NURSE ONLY (OUTPATIENT)
Dept: HEMATOLOGY/ONCOLOGY | Facility: HOSPITAL | Age: 67
End: 2017-07-24
Attending: INTERNAL MEDICINE
Payer: MEDICARE

## 2017-07-24 ENCOUNTER — PRIOR ORIGINAL RECORDS (OUTPATIENT)
Dept: OTHER | Age: 67
End: 2017-07-24

## 2017-07-24 DIAGNOSIS — D69.6 THROMBOCYTOPENIA (HCC): Primary | ICD-10-CM

## 2017-07-24 DIAGNOSIS — C83.08 SMALL CELL B-CELL LYMPHOMA OF LYMPH NODES OF MULTIPLE SITES (HCC): ICD-10-CM

## 2017-07-24 DIAGNOSIS — D69.3 IDIOPATHIC THROMBOCYTOPENIA PURPURA (HCC): ICD-10-CM

## 2017-07-24 LAB
ANTIBODY SCREEN: NEGATIVE
RH BLOOD TYPE: POSITIVE

## 2017-07-24 PROCEDURE — 86850 RBC ANTIBODY SCREEN: CPT

## 2017-07-24 PROCEDURE — 86901 BLOOD TYPING SEROLOGIC RH(D): CPT

## 2017-07-24 PROCEDURE — 86900 BLOOD TYPING SEROLOGIC ABO: CPT

## 2017-07-24 PROCEDURE — 36591 DRAW BLOOD OFF VENOUS DEVICE: CPT

## 2017-07-24 RX ORDER — SODIUM CHLORIDE 0.9 % (FLUSH) 0.9 %
10 SYRINGE (ML) INJECTION ONCE
Status: COMPLETED | OUTPATIENT
Start: 2017-07-24 | End: 2017-07-24

## 2017-07-24 RX ORDER — SODIUM CHLORIDE 0.9 % (FLUSH) 0.9 %
10 SYRINGE (ML) INJECTION ONCE
Status: CANCELLED | OUTPATIENT
Start: 2017-07-24

## 2017-07-24 RX ORDER — DIPHENHYDRAMINE HYDROCHLORIDE 50 MG/ML
50 INJECTION INTRAMUSCULAR; INTRAVENOUS AS NEEDED
Status: CANCELLED | OUTPATIENT
Start: 2017-07-24

## 2017-07-24 RX ORDER — DIPHENHYDRAMINE HYDROCHLORIDE 50 MG/ML
25 INJECTION INTRAMUSCULAR; INTRAVENOUS AS NEEDED
Status: CANCELLED | OUTPATIENT
Start: 2017-07-24

## 2017-07-24 RX ADMIN — SODIUM CHLORIDE 0.9 % (FLUSH) 10 ML: 0.9 % SYRINGE (ML) INJECTION at 16:30:00

## 2017-07-24 NOTE — PROGRESS NOTES
Pt arrived for T/S for plt transfusion tomorrow, pt states noticing petechiae and after labs drawn was shown to have thrombocytopenia, pt states increased fatigue

## 2017-07-25 ENCOUNTER — OFFICE VISIT (OUTPATIENT)
Dept: HEMATOLOGY/ONCOLOGY | Facility: HOSPITAL | Age: 67
End: 2017-07-25
Attending: INTERNAL MEDICINE
Payer: MEDICARE

## 2017-07-25 VITALS
SYSTOLIC BLOOD PRESSURE: 104 MMHG | RESPIRATION RATE: 18 BRPM | TEMPERATURE: 98 F | OXYGEN SATURATION: 100 % | DIASTOLIC BLOOD PRESSURE: 66 MMHG | HEART RATE: 67 BPM

## 2017-07-25 DIAGNOSIS — D69.6 THROMBOCYTOPENIA (HCC): Primary | ICD-10-CM

## 2017-07-25 DIAGNOSIS — D69.3 IDIOPATHIC THROMBOCYTOPENIA PURPURA (HCC): ICD-10-CM

## 2017-07-25 DIAGNOSIS — C83.08 SMALL CELL B-CELL LYMPHOMA OF LYMPH NODES OF MULTIPLE SITES (HCC): ICD-10-CM

## 2017-07-25 PROCEDURE — 36430 TRANSFUSION BLD/BLD COMPNT: CPT

## 2017-07-25 RX ORDER — ACETAMINOPHEN 325 MG/1
650 TABLET ORAL ONCE
Status: CANCELLED | OUTPATIENT
Start: 2017-07-25

## 2017-07-25 RX ORDER — DIPHENHYDRAMINE HYDROCHLORIDE 50 MG/ML
50 INJECTION INTRAMUSCULAR; INTRAVENOUS AS NEEDED
Status: CANCELLED | OUTPATIENT
Start: 2017-07-25

## 2017-07-25 RX ORDER — DIPHENHYDRAMINE HCL 25 MG
25 CAPSULE ORAL ONCE
Status: COMPLETED | OUTPATIENT
Start: 2017-07-25 | End: 2017-07-25

## 2017-07-25 RX ORDER — DIPHENHYDRAMINE HCL 25 MG
25 CAPSULE ORAL ONCE
Status: CANCELLED | OUTPATIENT
Start: 2017-07-25

## 2017-07-25 RX ORDER — SODIUM CHLORIDE 0.9 % (FLUSH) 0.9 %
10 SYRINGE (ML) INJECTION ONCE
Status: COMPLETED | OUTPATIENT
Start: 2017-07-25 | End: 2017-07-25

## 2017-07-25 RX ORDER — ACETAMINOPHEN 325 MG/1
650 TABLET ORAL ONCE
Status: COMPLETED | OUTPATIENT
Start: 2017-07-25 | End: 2017-07-25

## 2017-07-25 RX ORDER — SODIUM CHLORIDE 0.9 % (FLUSH) 0.9 %
10 SYRINGE (ML) INJECTION ONCE
Status: CANCELLED | OUTPATIENT
Start: 2017-07-25

## 2017-07-25 RX ORDER — DIPHENHYDRAMINE HYDROCHLORIDE 50 MG/ML
25 INJECTION INTRAMUSCULAR; INTRAVENOUS AS NEEDED
Status: CANCELLED | OUTPATIENT
Start: 2017-07-25

## 2017-07-25 RX ADMIN — SODIUM CHLORIDE 0.9 % (FLUSH) 10 ML: 0.9 % SYRINGE (ML) INJECTION at 12:30:00

## 2017-07-25 RX ADMIN — ACETAMINOPHEN 650 MG: 325 TABLET ORAL at 11:30:00

## 2017-07-25 RX ADMIN — DIPHENHYDRAMINE HCL 25 MG: 25 MG CAPSULE ORAL at 11:30:00

## 2017-07-25 NOTE — PROGRESS NOTES
Pt arrived for plt infusion x 1 unit, pt states she is supposed to be having transplant next month but plt levels low, pt alert and appears in nad, pt   ambulates with steady gait, pt denies any previous transfusion reactions,  Education Record    Learner:

## 2017-07-27 ENCOUNTER — PRIOR ORIGINAL RECORDS (OUTPATIENT)
Dept: OTHER | Age: 67
End: 2017-07-27

## 2017-07-31 ENCOUNTER — PRIOR ORIGINAL RECORDS (OUTPATIENT)
Dept: OTHER | Age: 67
End: 2017-07-31

## 2017-08-02 ENCOUNTER — PRIOR ORIGINAL RECORDS (OUTPATIENT)
Dept: OTHER | Age: 67
End: 2017-08-02

## 2017-08-02 ENCOUNTER — OFFICE VISIT (OUTPATIENT)
Dept: HEMATOLOGY/ONCOLOGY | Facility: HOSPITAL | Age: 67
End: 2017-08-02
Attending: INTERNAL MEDICINE
Payer: MEDICARE

## 2017-08-02 VITALS
RESPIRATION RATE: 18 BRPM | HEART RATE: 76 BPM | DIASTOLIC BLOOD PRESSURE: 51 MMHG | HEIGHT: 62.99 IN | TEMPERATURE: 99 F | WEIGHT: 155.19 LBS | SYSTOLIC BLOOD PRESSURE: 104 MMHG | BODY MASS INDEX: 27.5 KG/M2

## 2017-08-02 DIAGNOSIS — C85.88 LYMPHOSARCOMA OF LYMPH NODES OF MULTIPLE SITES (HCC): ICD-10-CM

## 2017-08-02 DIAGNOSIS — C83.08 SMALL CELL B-CELL LYMPHOMA OF LYMPH NODES OF MULTIPLE SITES (HCC): ICD-10-CM

## 2017-08-02 DIAGNOSIS — D69.6 THROMBOCYTOPENIA (HCC): Primary | ICD-10-CM

## 2017-08-02 DIAGNOSIS — Z51.11 ENCOUNTER FOR ANTINEOPLASTIC CHEMOTHERAPY: ICD-10-CM

## 2017-08-02 DIAGNOSIS — D69.3 IDIOPATHIC THROMBOCYTOPENIA PURPURA (HCC): ICD-10-CM

## 2017-08-02 LAB
ANTIBODY SCREEN: NEGATIVE
RH BLOOD TYPE: POSITIVE

## 2017-08-02 PROCEDURE — 86900 BLOOD TYPING SEROLOGIC ABO: CPT

## 2017-08-02 PROCEDURE — 86850 RBC ANTIBODY SCREEN: CPT

## 2017-08-02 PROCEDURE — 36430 TRANSFUSION BLD/BLD COMPNT: CPT

## 2017-08-02 PROCEDURE — 86901 BLOOD TYPING SEROLOGIC RH(D): CPT

## 2017-08-02 RX ORDER — SODIUM CHLORIDE 0.9 % (FLUSH) 0.9 %
10 SYRINGE (ML) INJECTION ONCE
Status: CANCELLED | OUTPATIENT
Start: 2017-08-02

## 2017-08-02 RX ORDER — DIPHENHYDRAMINE HCL 25 MG
25 CAPSULE ORAL ONCE
Status: COMPLETED | OUTPATIENT
Start: 2017-08-02 | End: 2017-08-02

## 2017-08-02 RX ORDER — ACETAMINOPHEN 325 MG/1
650 TABLET ORAL ONCE
Status: CANCELLED | OUTPATIENT
Start: 2017-08-02

## 2017-08-02 RX ORDER — DIPHENHYDRAMINE HCL 25 MG
25 CAPSULE ORAL ONCE
Status: CANCELLED | OUTPATIENT
Start: 2017-08-02

## 2017-08-02 RX ORDER — ACETAMINOPHEN 325 MG/1
650 TABLET ORAL ONCE
Status: COMPLETED | OUTPATIENT
Start: 2017-08-02 | End: 2017-08-02

## 2017-08-02 RX ORDER — DIPHENHYDRAMINE HYDROCHLORIDE 50 MG/ML
50 INJECTION INTRAMUSCULAR; INTRAVENOUS AS NEEDED
Status: CANCELLED | OUTPATIENT
Start: 2017-08-02

## 2017-08-02 RX ORDER — SODIUM CHLORIDE 0.9 % (FLUSH) 0.9 %
10 SYRINGE (ML) INJECTION ONCE
Status: COMPLETED | OUTPATIENT
Start: 2017-08-02 | End: 2017-08-02

## 2017-08-02 RX ORDER — DIPHENHYDRAMINE HYDROCHLORIDE 50 MG/ML
25 INJECTION INTRAMUSCULAR; INTRAVENOUS AS NEEDED
Status: CANCELLED | OUTPATIENT
Start: 2017-08-02

## 2017-08-02 RX ADMIN — SODIUM CHLORIDE 0.9 % (FLUSH) 10 ML: 0.9 % SYRINGE (ML) INJECTION at 13:30:00

## 2017-08-02 RX ADMIN — DIPHENHYDRAMINE HCL 25 MG: 25 MG CAPSULE ORAL at 12:05:00

## 2017-08-02 RX ADMIN — ACETAMINOPHEN 650 MG: 325 TABLET ORAL at 12:05:00

## 2017-08-03 ENCOUNTER — PRIOR ORIGINAL RECORDS (OUTPATIENT)
Dept: OTHER | Age: 67
End: 2017-08-03

## 2017-08-03 LAB — BLOOD TYPE BARCODE: 5100

## 2017-08-07 ENCOUNTER — PRIOR ORIGINAL RECORDS (OUTPATIENT)
Dept: OTHER | Age: 67
End: 2017-08-07

## 2017-08-07 ENCOUNTER — OFFICE VISIT (OUTPATIENT)
Dept: HEMATOLOGY/ONCOLOGY | Facility: HOSPITAL | Age: 67
End: 2017-08-07
Attending: INTERNAL MEDICINE
Payer: MEDICARE

## 2017-08-07 VITALS
HEIGHT: 62.99 IN | RESPIRATION RATE: 18 BRPM | DIASTOLIC BLOOD PRESSURE: 63 MMHG | WEIGHT: 156.19 LBS | SYSTOLIC BLOOD PRESSURE: 116 MMHG | TEMPERATURE: 97 F | OXYGEN SATURATION: 96 % | HEART RATE: 69 BPM | BODY MASS INDEX: 27.68 KG/M2

## 2017-08-07 DIAGNOSIS — C85.88 LYMPHOSARCOMA OF LYMPH NODES OF MULTIPLE SITES (HCC): ICD-10-CM

## 2017-08-07 DIAGNOSIS — C83.08 SMALL CELL B-CELL LYMPHOMA OF LYMPH NODES OF MULTIPLE SITES (HCC): Primary | ICD-10-CM

## 2017-08-07 DIAGNOSIS — D64.89 ANEMIA DUE TO OTHER CAUSE: ICD-10-CM

## 2017-08-07 DIAGNOSIS — Z51.11 ENCOUNTER FOR ANTINEOPLASTIC CHEMOTHERAPY: ICD-10-CM

## 2017-08-07 LAB
ANTIBODY SCREEN: NEGATIVE
RH BLOOD TYPE: POSITIVE

## 2017-08-07 PROCEDURE — 86900 BLOOD TYPING SEROLOGIC ABO: CPT

## 2017-08-07 PROCEDURE — 86901 BLOOD TYPING SEROLOGIC RH(D): CPT

## 2017-08-07 PROCEDURE — 86850 RBC ANTIBODY SCREEN: CPT

## 2017-08-07 PROCEDURE — 36430 TRANSFUSION BLD/BLD COMPNT: CPT

## 2017-08-07 PROCEDURE — 86920 COMPATIBILITY TEST SPIN: CPT

## 2017-08-07 RX ORDER — ACETAMINOPHEN 325 MG/1
650 TABLET ORAL ONCE
Status: CANCELLED | OUTPATIENT
Start: 2017-08-07

## 2017-08-07 RX ORDER — ACETAMINOPHEN 325 MG/1
650 TABLET ORAL ONCE
Status: COMPLETED | OUTPATIENT
Start: 2017-08-07 | End: 2017-08-07

## 2017-08-07 RX ORDER — DIPHENHYDRAMINE HCL 25 MG
25 CAPSULE ORAL ONCE
Status: CANCELLED | OUTPATIENT
Start: 2017-08-07

## 2017-08-07 RX ORDER — DIPHENHYDRAMINE HCL 25 MG
25 CAPSULE ORAL ONCE
Status: COMPLETED | OUTPATIENT
Start: 2017-08-07 | End: 2017-08-07

## 2017-08-07 RX ADMIN — DIPHENHYDRAMINE HCL 25 MG: 25 MG CAPSULE ORAL at 12:17:00

## 2017-08-07 RX ADMIN — ACETAMINOPHEN 650 MG: 325 TABLET ORAL at 12:17:00

## 2017-08-07 NOTE — PROGRESS NOTES
Education Record    Learner:  Patient    Disease / Diagnosis: Anemia    Barriers / Limitations:  None   Comments:    Method:  Discussion and Printed material   Comments:    General Topics:  Medication, Precautions, Procedure and Plan of care reviewed   Com

## 2017-08-08 LAB — BLOOD TYPE BARCODE: 6200

## 2017-08-11 ENCOUNTER — PRIOR ORIGINAL RECORDS (OUTPATIENT)
Dept: OTHER | Age: 67
End: 2017-08-11

## 2017-08-11 ENCOUNTER — HOSPITAL ENCOUNTER (OUTPATIENT)
Facility: HOSPITAL | Age: 67
Discharge: HOME | End: 2017-08-11
Attending: INTERNAL MEDICINE | Admitting: INTERNAL MEDICINE
Payer: MEDICARE

## 2017-08-11 VITALS
RESPIRATION RATE: 18 BRPM | SYSTOLIC BLOOD PRESSURE: 110 MMHG | OXYGEN SATURATION: 100 % | HEART RATE: 73 BPM | TEMPERATURE: 98 F | DIASTOLIC BLOOD PRESSURE: 58 MMHG | WEIGHT: 156.19 LBS | BODY MASS INDEX: 28 KG/M2

## 2017-08-11 LAB
ANTIBODY SCREEN: NEGATIVE
RH BLOOD TYPE: POSITIVE

## 2017-08-11 PROCEDURE — 30233R1 TRANSFUSION OF NONAUTOLOGOUS PLATELETS INTO PERIPHERAL VEIN, PERCUTANEOUS APPROACH: ICD-10-PCS | Performed by: INTERNAL MEDICINE

## 2017-08-11 PROCEDURE — 86901 BLOOD TYPING SEROLOGIC RH(D): CPT | Performed by: INTERNAL MEDICINE

## 2017-08-11 PROCEDURE — 86900 BLOOD TYPING SEROLOGIC ABO: CPT | Performed by: INTERNAL MEDICINE

## 2017-08-11 PROCEDURE — 86850 RBC ANTIBODY SCREEN: CPT | Performed by: INTERNAL MEDICINE

## 2017-08-11 PROCEDURE — 36430 TRANSFUSION BLD/BLD COMPNT: CPT

## 2017-08-11 RX ORDER — DIPHENHYDRAMINE HCL 25 MG
25 CAPSULE ORAL ONCE
Status: COMPLETED | OUTPATIENT
Start: 2017-08-11 | End: 2017-08-11

## 2017-08-11 RX ORDER — SODIUM CHLORIDE 9 MG/ML
INJECTION, SOLUTION INTRAVENOUS ONCE
Status: COMPLETED | OUTPATIENT
Start: 2017-08-11 | End: 2017-08-11

## 2017-08-11 RX ORDER — ACETAMINOPHEN 325 MG/1
650 TABLET ORAL ONCE
Status: COMPLETED | OUTPATIENT
Start: 2017-08-11 | End: 2017-08-11

## 2017-08-11 NOTE — PROGRESS NOTES
Spoke with dr. Vasu Manrique. No need to redraw plt level prior to discharge.  Just give 1 unit plt and discharge

## 2017-08-11 NOTE — PROGRESS NOTES
NURSING DISCHARGE NOTE    Discharged Home via Wheelchair. Accompanied by Spouse  Belongings Taken by patient/family.     plts given no complications  Instructions to follow up on Monday with dr. Evelin Simon wnl  No c/o pain  Port flushed with heparin

## 2017-08-12 LAB — BLOOD TYPE BARCODE: 6200

## 2017-08-14 ENCOUNTER — PRIOR ORIGINAL RECORDS (OUTPATIENT)
Dept: OTHER | Age: 67
End: 2017-08-14

## 2017-08-17 ENCOUNTER — PRIOR ORIGINAL RECORDS (OUTPATIENT)
Dept: OTHER | Age: 67
End: 2017-08-17

## 2017-08-17 ENCOUNTER — NURSE ONLY (OUTPATIENT)
Dept: HEMATOLOGY/ONCOLOGY | Facility: HOSPITAL | Age: 67
End: 2017-08-17
Attending: INTERNAL MEDICINE
Payer: MEDICARE

## 2017-08-17 DIAGNOSIS — D69.6 THROMBOCYTOPENIA (HCC): Primary | ICD-10-CM

## 2017-08-17 DIAGNOSIS — C85.88 LYMPHOSARCOMA OF LYMPH NODES OF MULTIPLE SITES (HCC): ICD-10-CM

## 2017-08-17 DIAGNOSIS — D69.3 IDIOPATHIC THROMBOCYTOPENIA PURPURA (HCC): ICD-10-CM

## 2017-08-17 DIAGNOSIS — D64.89 ANEMIA DUE TO OTHER CAUSE: ICD-10-CM

## 2017-08-17 LAB
ANTIBODY SCREEN: NEGATIVE
RH BLOOD TYPE: POSITIVE

## 2017-08-17 PROCEDURE — 86850 RBC ANTIBODY SCREEN: CPT

## 2017-08-17 PROCEDURE — 86901 BLOOD TYPING SEROLOGIC RH(D): CPT

## 2017-08-17 PROCEDURE — 36591 DRAW BLOOD OFF VENOUS DEVICE: CPT

## 2017-08-17 PROCEDURE — 86900 BLOOD TYPING SEROLOGIC ABO: CPT

## 2017-08-17 RX ORDER — SODIUM CHLORIDE 0.9 % (FLUSH) 0.9 %
10 SYRINGE (ML) INJECTION ONCE
Status: COMPLETED | OUTPATIENT
Start: 2017-08-17 | End: 2017-08-17

## 2017-08-17 RX ORDER — DIPHENHYDRAMINE HYDROCHLORIDE 50 MG/ML
50 INJECTION INTRAMUSCULAR; INTRAVENOUS AS NEEDED
Status: CANCELLED | OUTPATIENT
Start: 2017-08-17

## 2017-08-17 RX ORDER — DIPHENHYDRAMINE HYDROCHLORIDE 50 MG/ML
25 INJECTION INTRAMUSCULAR; INTRAVENOUS AS NEEDED
Status: CANCELLED | OUTPATIENT
Start: 2017-08-17

## 2017-08-17 RX ORDER — SODIUM CHLORIDE 0.9 % (FLUSH) 0.9 %
10 SYRINGE (ML) INJECTION ONCE
Status: CANCELLED | OUTPATIENT
Start: 2017-08-17

## 2017-08-17 RX ADMIN — SODIUM CHLORIDE 0.9 % (FLUSH) 10 ML: 0.9 % SYRINGE (ML) INJECTION at 13:45:00

## 2017-08-18 ENCOUNTER — OFFICE VISIT (OUTPATIENT)
Dept: HEMATOLOGY/ONCOLOGY | Facility: HOSPITAL | Age: 67
End: 2017-08-18
Attending: INTERNAL MEDICINE
Payer: MEDICARE

## 2017-08-18 VITALS
TEMPERATURE: 98 F | RESPIRATION RATE: 19 BRPM | DIASTOLIC BLOOD PRESSURE: 65 MMHG | SYSTOLIC BLOOD PRESSURE: 105 MMHG | OXYGEN SATURATION: 99 % | HEART RATE: 65 BPM

## 2017-08-18 DIAGNOSIS — C85.88 LYMPHOSARCOMA OF LYMPH NODES OF MULTIPLE SITES (HCC): ICD-10-CM

## 2017-08-18 DIAGNOSIS — D69.6 THROMBOCYTOPENIA (HCC): Primary | ICD-10-CM

## 2017-08-18 DIAGNOSIS — D64.89 ANEMIA DUE TO OTHER CAUSE: ICD-10-CM

## 2017-08-18 PROCEDURE — 36430 TRANSFUSION BLD/BLD COMPNT: CPT

## 2017-08-18 RX ORDER — ACETAMINOPHEN 325 MG/1
650 TABLET ORAL ONCE
Status: CANCELLED | OUTPATIENT
Start: 2017-08-18

## 2017-08-18 RX ORDER — ACETAMINOPHEN 325 MG/1
650 TABLET ORAL ONCE
Status: COMPLETED | OUTPATIENT
Start: 2017-08-18 | End: 2017-08-18

## 2017-08-18 RX ORDER — DIPHENHYDRAMINE HCL 25 MG
25 CAPSULE ORAL ONCE
Status: COMPLETED | OUTPATIENT
Start: 2017-08-18 | End: 2017-08-18

## 2017-08-18 RX ORDER — DIPHENHYDRAMINE HCL 25 MG
25 CAPSULE ORAL ONCE
Status: CANCELLED | OUTPATIENT
Start: 2017-08-18

## 2017-08-18 RX ADMIN — ACETAMINOPHEN 650 MG: 325 TABLET ORAL at 10:00:00

## 2017-08-18 RX ADMIN — DIPHENHYDRAMINE HCL 25 MG: 25 MG CAPSULE ORAL at 10:00:00

## 2017-08-18 NOTE — PROGRESS NOTES
Education Record    Learner:  Patient    Disease / Diagnosis:1 unit of PRBC +1 unit of PLTC    Barriers / Limitations:  None   Comments:    Method:  Brief focused   Comments:    General Topics:  Infection, Medication, Pain, Precautions, Procedure, Side eff

## 2017-08-21 ENCOUNTER — PRIOR ORIGINAL RECORDS (OUTPATIENT)
Dept: OTHER | Age: 67
End: 2017-08-21

## 2017-08-24 ENCOUNTER — PRIOR ORIGINAL RECORDS (OUTPATIENT)
Dept: OTHER | Age: 67
End: 2017-08-24

## 2017-08-28 ENCOUNTER — NURSE ONLY (OUTPATIENT)
Dept: HEMATOLOGY/ONCOLOGY | Facility: HOSPITAL | Age: 67
End: 2017-08-28
Attending: INTERNAL MEDICINE
Payer: MEDICARE

## 2017-08-28 ENCOUNTER — PRIOR ORIGINAL RECORDS (OUTPATIENT)
Dept: OTHER | Age: 67
End: 2017-08-28

## 2017-08-28 DIAGNOSIS — D69.3 IDIOPATHIC THROMBOCYTOPENIA PURPURA (HCC): ICD-10-CM

## 2017-08-28 DIAGNOSIS — D69.6 THROMBOCYTOPENIA (HCC): Primary | ICD-10-CM

## 2017-08-28 DIAGNOSIS — C85.88 LYMPHOSARCOMA OF LYMPH NODES OF MULTIPLE SITES (HCC): ICD-10-CM

## 2017-08-28 LAB
ANTIBODY SCREEN: NEGATIVE
RH BLOOD TYPE: POSITIVE

## 2017-08-28 PROCEDURE — 36591 DRAW BLOOD OFF VENOUS DEVICE: CPT

## 2017-08-28 PROCEDURE — 86901 BLOOD TYPING SEROLOGIC RH(D): CPT

## 2017-08-28 PROCEDURE — 86900 BLOOD TYPING SEROLOGIC ABO: CPT

## 2017-08-28 PROCEDURE — 86850 RBC ANTIBODY SCREEN: CPT

## 2017-08-28 RX ORDER — DIPHENHYDRAMINE HYDROCHLORIDE 50 MG/ML
50 INJECTION INTRAMUSCULAR; INTRAVENOUS AS NEEDED
Status: CANCELLED | OUTPATIENT
Start: 2017-08-28

## 2017-08-28 RX ORDER — SODIUM CHLORIDE 0.9 % (FLUSH) 0.9 %
10 SYRINGE (ML) INJECTION ONCE
Status: COMPLETED | OUTPATIENT
Start: 2017-08-28 | End: 2017-08-28

## 2017-08-28 RX ORDER — SODIUM CHLORIDE 0.9 % (FLUSH) 0.9 %
10 SYRINGE (ML) INJECTION ONCE
Status: CANCELLED | OUTPATIENT
Start: 2017-08-28

## 2017-08-28 RX ORDER — DIPHENHYDRAMINE HYDROCHLORIDE 50 MG/ML
25 INJECTION INTRAMUSCULAR; INTRAVENOUS AS NEEDED
Status: CANCELLED | OUTPATIENT
Start: 2017-08-28

## 2017-08-28 RX ADMIN — SODIUM CHLORIDE 0.9 % (FLUSH) 10 ML: 0.9 % SYRINGE (ML) INJECTION at 16:30:00

## 2017-08-29 ENCOUNTER — OFFICE VISIT (OUTPATIENT)
Dept: HEMATOLOGY/ONCOLOGY | Facility: HOSPITAL | Age: 67
End: 2017-08-29
Attending: INTERNAL MEDICINE
Payer: MEDICARE

## 2017-08-29 VITALS
WEIGHT: 158.19 LBS | HEART RATE: 71 BPM | TEMPERATURE: 97 F | DIASTOLIC BLOOD PRESSURE: 66 MMHG | HEIGHT: 62.99 IN | SYSTOLIC BLOOD PRESSURE: 107 MMHG | BODY MASS INDEX: 28.03 KG/M2 | OXYGEN SATURATION: 100 % | RESPIRATION RATE: 16 BRPM

## 2017-08-29 DIAGNOSIS — D69.6 THROMBOCYTOPENIA (HCC): Primary | ICD-10-CM

## 2017-08-29 DIAGNOSIS — C85.88 LYMPHOSARCOMA OF LYMPH NODES OF MULTIPLE SITES (HCC): ICD-10-CM

## 2017-08-29 DIAGNOSIS — D69.3 IDIOPATHIC THROMBOCYTOPENIA PURPURA (HCC): ICD-10-CM

## 2017-08-29 PROCEDURE — 36430 TRANSFUSION BLD/BLD COMPNT: CPT

## 2017-08-29 RX ORDER — DIPHENHYDRAMINE HYDROCHLORIDE 50 MG/ML
25 INJECTION INTRAMUSCULAR; INTRAVENOUS AS NEEDED
Status: CANCELLED | OUTPATIENT
Start: 2017-08-29

## 2017-08-29 RX ORDER — SODIUM CHLORIDE 0.9 % (FLUSH) 0.9 %
10 SYRINGE (ML) INJECTION ONCE
Status: CANCELLED | OUTPATIENT
Start: 2017-08-29

## 2017-08-29 RX ORDER — DIPHENHYDRAMINE HCL 25 MG
25 CAPSULE ORAL ONCE
Status: COMPLETED | OUTPATIENT
Start: 2017-08-29 | End: 2017-08-29

## 2017-08-29 RX ORDER — SODIUM CHLORIDE 0.9 % (FLUSH) 0.9 %
10 SYRINGE (ML) INJECTION ONCE
Status: COMPLETED | OUTPATIENT
Start: 2017-08-29 | End: 2017-08-29

## 2017-08-29 RX ORDER — ACETAMINOPHEN 325 MG/1
650 TABLET ORAL ONCE
Status: COMPLETED | OUTPATIENT
Start: 2017-08-29 | End: 2017-08-29

## 2017-08-29 RX ORDER — ACETAMINOPHEN 325 MG/1
650 TABLET ORAL ONCE
Status: CANCELLED | OUTPATIENT
Start: 2017-08-29

## 2017-08-29 RX ORDER — DIPHENHYDRAMINE HYDROCHLORIDE 50 MG/ML
50 INJECTION INTRAMUSCULAR; INTRAVENOUS AS NEEDED
Status: CANCELLED | OUTPATIENT
Start: 2017-08-29

## 2017-08-29 RX ORDER — DIPHENHYDRAMINE HCL 25 MG
25 CAPSULE ORAL ONCE
Status: CANCELLED | OUTPATIENT
Start: 2017-08-29

## 2017-08-29 RX ADMIN — ACETAMINOPHEN 650 MG: 325 TABLET ORAL at 11:07:00

## 2017-08-29 RX ADMIN — SODIUM CHLORIDE 0.9 % (FLUSH) 10 ML: 0.9 % SYRINGE (ML) INJECTION at 12:35:00

## 2017-08-29 RX ADMIN — DIPHENHYDRAMINE HCL 25 MG: 25 MG CAPSULE ORAL at 11:07:00

## 2017-08-31 ENCOUNTER — PRIOR ORIGINAL RECORDS (OUTPATIENT)
Dept: OTHER | Age: 67
End: 2017-08-31

## 2017-09-05 ENCOUNTER — PRIOR ORIGINAL RECORDS (OUTPATIENT)
Dept: OTHER | Age: 67
End: 2017-09-05

## 2017-09-07 ENCOUNTER — PRIOR ORIGINAL RECORDS (OUTPATIENT)
Dept: OTHER | Age: 67
End: 2017-09-07

## 2017-09-07 ENCOUNTER — OFFICE VISIT (OUTPATIENT)
Dept: HEMATOLOGY/ONCOLOGY | Facility: HOSPITAL | Age: 67
End: 2017-09-07
Attending: INTERNAL MEDICINE
Payer: MEDICARE

## 2017-09-07 VITALS
TEMPERATURE: 98 F | HEART RATE: 73 BPM | RESPIRATION RATE: 16 BRPM | DIASTOLIC BLOOD PRESSURE: 56 MMHG | SYSTOLIC BLOOD PRESSURE: 102 MMHG

## 2017-09-07 DIAGNOSIS — C85.88 LYMPHOSARCOMA OF LYMPH NODES OF MULTIPLE SITES (HCC): Primary | ICD-10-CM

## 2017-09-07 DIAGNOSIS — D69.6 THROMBOCYTOPENIA (HCC): ICD-10-CM

## 2017-09-07 DIAGNOSIS — D69.3 IDIOPATHIC THROMBOCYTOPENIA PURPURA (HCC): ICD-10-CM

## 2017-09-07 DIAGNOSIS — D64.9 ANEMIA: ICD-10-CM

## 2017-09-07 LAB
ANTIBODY SCREEN: NEGATIVE
RH BLOOD TYPE: POSITIVE

## 2017-09-07 PROCEDURE — 36430 TRANSFUSION BLD/BLD COMPNT: CPT

## 2017-09-07 PROCEDURE — 86900 BLOOD TYPING SEROLOGIC ABO: CPT

## 2017-09-07 PROCEDURE — 86850 RBC ANTIBODY SCREEN: CPT

## 2017-09-07 PROCEDURE — 86901 BLOOD TYPING SEROLOGIC RH(D): CPT

## 2017-09-07 RX ORDER — DIPHENHYDRAMINE HYDROCHLORIDE 50 MG/ML
50 INJECTION INTRAMUSCULAR; INTRAVENOUS AS NEEDED
Status: CANCELLED | OUTPATIENT
Start: 2017-09-07

## 2017-09-07 RX ORDER — SODIUM CHLORIDE 0.9 % (FLUSH) 0.9 %
10 SYRINGE (ML) INJECTION ONCE
Status: COMPLETED | OUTPATIENT
Start: 2017-09-07 | End: 2017-09-07

## 2017-09-07 RX ORDER — DIPHENHYDRAMINE HYDROCHLORIDE 50 MG/ML
25 INJECTION INTRAMUSCULAR; INTRAVENOUS AS NEEDED
Status: CANCELLED | OUTPATIENT
Start: 2017-09-07

## 2017-09-07 RX ORDER — ACETAMINOPHEN 325 MG/1
650 TABLET ORAL ONCE
Status: COMPLETED | OUTPATIENT
Start: 2017-09-07 | End: 2017-09-07

## 2017-09-07 RX ORDER — DIPHENHYDRAMINE HCL 25 MG
25 CAPSULE ORAL ONCE
Status: CANCELLED | OUTPATIENT
Start: 2017-09-07

## 2017-09-07 RX ORDER — ACETAMINOPHEN 325 MG/1
650 TABLET ORAL ONCE
Status: CANCELLED | OUTPATIENT
Start: 2017-09-07

## 2017-09-07 RX ORDER — DIPHENHYDRAMINE HCL 25 MG
25 CAPSULE ORAL ONCE
Status: COMPLETED | OUTPATIENT
Start: 2017-09-07 | End: 2017-09-07

## 2017-09-07 RX ORDER — SODIUM CHLORIDE 0.9 % (FLUSH) 0.9 %
10 SYRINGE (ML) INJECTION ONCE
Status: CANCELLED | OUTPATIENT
Start: 2017-09-07

## 2017-09-07 RX ADMIN — DIPHENHYDRAMINE HCL 25 MG: 25 MG CAPSULE ORAL at 13:48:00

## 2017-09-07 RX ADMIN — ACETAMINOPHEN 650 MG: 325 TABLET ORAL at 13:48:00

## 2017-09-07 RX ADMIN — SODIUM CHLORIDE 0.9 % (FLUSH) 10 ML: 0.9 % SYRINGE (ML) INJECTION at 15:26:00

## 2017-09-07 NOTE — PROGRESS NOTES
Education Record    Learner:  Patient    Disease / Diagnosis:    Barriers / Limitations:  None   Comments:    Method:  Discussion and Printed material   Comments:    General Topics:  Plan of care reviewed   Comments:    Outcome:  Shows understanding   Comm

## 2017-09-08 LAB — BLOOD TYPE BARCODE: 7300

## 2017-09-11 ENCOUNTER — PRIOR ORIGINAL RECORDS (OUTPATIENT)
Dept: OTHER | Age: 67
End: 2017-09-11

## 2017-09-13 ENCOUNTER — PRIOR ORIGINAL RECORDS (OUTPATIENT)
Dept: OTHER | Age: 67
End: 2017-09-13

## 2017-09-14 ENCOUNTER — PRIOR ORIGINAL RECORDS (OUTPATIENT)
Dept: OTHER | Age: 67
End: 2017-09-14

## 2017-09-14 ENCOUNTER — OFFICE VISIT (OUTPATIENT)
Dept: HEMATOLOGY/ONCOLOGY | Facility: HOSPITAL | Age: 67
End: 2017-09-14
Attending: INTERNAL MEDICINE
Payer: MEDICARE

## 2017-09-14 VITALS
HEART RATE: 78 BPM | RESPIRATION RATE: 18 BRPM | DIASTOLIC BLOOD PRESSURE: 51 MMHG | TEMPERATURE: 98 F | HEIGHT: 62.99 IN | SYSTOLIC BLOOD PRESSURE: 100 MMHG | BODY MASS INDEX: 27.68 KG/M2 | OXYGEN SATURATION: 97 % | WEIGHT: 156.19 LBS

## 2017-09-14 DIAGNOSIS — D69.6 THROMBOCYTOPENIA (HCC): ICD-10-CM

## 2017-09-14 DIAGNOSIS — D64.9 ANEMIA: ICD-10-CM

## 2017-09-14 DIAGNOSIS — C85.88 LYMPHOSARCOMA OF LYMPH NODES OF MULTIPLE SITES (HCC): Primary | ICD-10-CM

## 2017-09-14 LAB
ANTIBODY SCREEN: NEGATIVE
RH BLOOD TYPE: POSITIVE

## 2017-09-14 PROCEDURE — 86900 BLOOD TYPING SEROLOGIC ABO: CPT

## 2017-09-14 PROCEDURE — 86901 BLOOD TYPING SEROLOGIC RH(D): CPT

## 2017-09-14 PROCEDURE — 36430 TRANSFUSION BLD/BLD COMPNT: CPT

## 2017-09-14 PROCEDURE — 86850 RBC ANTIBODY SCREEN: CPT

## 2017-09-14 RX ORDER — DIPHENHYDRAMINE HCL 25 MG
25 CAPSULE ORAL ONCE
Status: CANCELLED | OUTPATIENT
Start: 2017-09-14

## 2017-09-14 RX ORDER — ACETAMINOPHEN 325 MG/1
650 TABLET ORAL ONCE
Status: COMPLETED | OUTPATIENT
Start: 2017-09-14 | End: 2017-09-14

## 2017-09-14 RX ORDER — ACETAMINOPHEN 325 MG/1
650 TABLET ORAL ONCE
Status: CANCELLED | OUTPATIENT
Start: 2017-09-14

## 2017-09-14 RX ORDER — DIPHENHYDRAMINE HCL 25 MG
25 CAPSULE ORAL ONCE
Status: COMPLETED | OUTPATIENT
Start: 2017-09-14 | End: 2017-09-14

## 2017-09-14 RX ADMIN — DIPHENHYDRAMINE HCL 25 MG: 25 MG CAPSULE ORAL at 15:25:00

## 2017-09-14 RX ADMIN — ACETAMINOPHEN 650 MG: 325 TABLET ORAL at 15:25:00

## 2017-09-14 NOTE — PROGRESS NOTES
Education Record    Learner:  Patient    Disease / Diagnosis:    Barriers / Limitations:  None   Comments:    Method:  Brief focused and Discussion   Comments:    General Topics:  Medication and Plan of care reviewed   Comments:    Outcome:  Shows Mehreen

## 2017-09-15 LAB — BLOOD TYPE BARCODE: 9500

## 2017-09-19 ENCOUNTER — PRIOR ORIGINAL RECORDS (OUTPATIENT)
Dept: OTHER | Age: 67
End: 2017-09-19

## 2017-09-21 ENCOUNTER — PRIOR ORIGINAL RECORDS (OUTPATIENT)
Dept: OTHER | Age: 67
End: 2017-09-21

## 2017-09-25 ENCOUNTER — PRIOR ORIGINAL RECORDS (OUTPATIENT)
Dept: OTHER | Age: 67
End: 2017-09-25

## 2017-09-28 ENCOUNTER — PRIOR ORIGINAL RECORDS (OUTPATIENT)
Dept: OTHER | Age: 67
End: 2017-09-28

## 2017-10-02 ENCOUNTER — PRIOR ORIGINAL RECORDS (OUTPATIENT)
Dept: OTHER | Age: 67
End: 2017-10-02

## 2017-10-05 ENCOUNTER — PRIOR ORIGINAL RECORDS (OUTPATIENT)
Dept: OTHER | Age: 67
End: 2017-10-05

## 2017-10-09 ENCOUNTER — PRIOR ORIGINAL RECORDS (OUTPATIENT)
Dept: OTHER | Age: 67
End: 2017-10-09

## 2017-10-13 ENCOUNTER — PRIOR ORIGINAL RECORDS (OUTPATIENT)
Dept: OTHER | Age: 67
End: 2017-10-13

## 2017-10-13 PROCEDURE — 86803 HEPATITIS C AB TEST: CPT | Performed by: INTERNAL MEDICINE

## 2017-10-13 PROCEDURE — 36415 COLL VENOUS BLD VENIPUNCTURE: CPT | Performed by: INTERNAL MEDICINE

## 2017-12-31 ENCOUNTER — PRIOR ORIGINAL RECORDS (OUTPATIENT)
Dept: OTHER | Age: 67
End: 2017-12-31

## 2018-06-13 PROBLEM — D46.9 MDS (MYELODYSPLASTIC SYNDROME) (HCC): Status: ACTIVE | Noted: 2018-06-13

## 2018-06-13 PROBLEM — Z85.79 HISTORY OF LYMPHOMA: Status: ACTIVE | Noted: 2018-06-13

## 2018-07-11 PROBLEM — M62.81 MUSCLE WEAKNESS: Status: ACTIVE | Noted: 2018-07-11

## 2018-07-12 PROBLEM — M62.81 MUSCLE WEAKNESS (GENERALIZED): Status: ACTIVE | Noted: 2018-07-12

## 2020-10-09 LAB
ALBUMIN/GLOB SERPL: 1.1 (CALC) (ref 1–2.5)
ALBUMIN/GLOB SERPL: 1.4 (CALC) (ref 1–2.5)
ALBUMIN/GLOB SERPL: 2.2 (CALC) (ref 1–2.5)
ALBUMIN/GLOB SERPL: 2.3 (CALC) (ref 1–2.5)
ALBUMIN/GLOB SERPL: 2.5 (CALC) (ref 1–2.5)
ALBUMIN/GLOB SERPL: 2.5 (CALC) (ref 1–2.5)
ALBUMIN/GLOB SERPL: 2.6 (CALC) (ref 1–2.5)
ALBUMIN/GLOB SERPL: 2.8 (CALC) (ref 1–2.5)
ALBUMIN/GLOB SERPL: 2.9 (CALC) (ref 1–2.5)
ALBUMIN: 4 G/DL (ref 3.6–5.1)
ALBUMIN: 4 G/DL (ref 3.6–5.1)
ALBUMIN: 4.1 G/DL (ref 3.6–5.1)
ALBUMIN: 4.2 G/DL (ref 3.6–5.1)
ALBUMIN: 4.4 G/DL (ref 3.6–5.1)
ALBUMIN: 4.4 G/DL (ref 3.6–5.1)
ALBUMIN: 4.5 G/DL (ref 3.6–5.1)
ALKALINE PHOSPHATASE: 47 UNIT/L (ref 33–130)
ALKALINE PHOSPHATASE: 50 UNIT/L (ref 33–130)
ALKALINE PHOSPHATASE: 52 UNIT/L (ref 33–130)
ALKALINE PHOSPHATASE: 53 UNIT/L (ref 33–130)
ALKALINE PHOSPHATASE: 55 UNIT/L (ref 33–130)
ALKALINE PHOSPHATASE: 57 UNIT/L (ref 33–130)
ALKALINE PHOSPHATASE: 58 UNIT/L (ref 33–130)
ALKALINE PHOSPHATASE: 60 UNIT/L (ref 33–130)
ALKALINE PHOSPHATASE: 66 UNIT/L (ref 33–130)
ALT: 20 UNIT/L (ref 6–29)
ALT: 22 UNIT/L (ref 6–29)
ALT: 24 UNIT/L (ref 6–29)
ALT: 25 UNIT/L (ref 6–29)
ALT: 26 UNIT/L (ref 6–29)
ALT: 29 UNIT/L (ref 6–29)
ALT: 29 UNIT/L (ref 6–29)
ALT: 34 UNIT/L (ref 6–29)
ALT: 39 UNIT/L (ref 6–29)
ANA SCREEN: NEGATIVE
AST: 15 UNIT/L (ref 10–35)
AST: 16 UNIT/L (ref 10–35)
AST: 17 UNIT/L (ref 10–35)
AST: 18 UNIT/L (ref 10–35)
AST: 19 UNIT/L (ref 10–35)
AST: 19 UNIT/L (ref 10–35)
AST: 20 UNIT/L (ref 10–35)
AST: 21 UNIT/L (ref 10–35)
AST: 24 UNIT/L (ref 10–35)
BASO%: 0.2 %
BASO%: 0.3 %
BASO%: 0.3 %
BASO%: 0.4 %
BASO%: 0.5 %
BASO%: 0.7 %
BASO%: 0.9 %
BASO%: 1 %
BASO%: 1.1 %
BASO%: 1.4 %
BASO%: 1.5 %
BASO%: 1.8 %
BASO%: 2 %
BASO%: 2.1 %
BASO%: 2.2 %
BASO%: 2.4 %
BASO%: 2.5 %
BASO%: 2.5 %
BASO%: 2.7 %
BASO%: 2.7 %
BASO%: 2.8 %
BASO%: 2.9 %
BASO%: 3 %
BASO%: 3.2 %
BASO%: 3.4 %
BASO%: 3.4 %
BASO%: 3.5 %
BASO%: 3.6 %
BASO%: 3.8 %
BASO%: 4.1 %
BASO%: 4.6 %
BASO%: 5.1 %
BASO%: 5.4 %
BASO%: 8.4 %
BASO: 0 10^3/UL
BASO: 0.05 10^3/UL
BASO: 0.1 10^3/UL
BASO: 0.2 10^3/UL
BASO: 0.3 10^3/UL
BASO: 0.3 10^3/UL
BASO: 0.4 10^3/UL
BILIRUBIN, TOTAL: 0.2 MG/DL (ref 0.2–1.2)
BILIRUBIN, TOTAL: 0.2 MG/DL (ref 0.2–1.2)
BILIRUBIN, TOTAL: 0.3 MG/DL (ref 0.2–1.2)
BILIRUBIN, TOTAL: 0.4 MG/DL (ref 0.2–1.2)
BILIRUBIN, TOTAL: 0.4 MG/DL (ref 0.2–1.2)
BILIRUBIN, TOTAL: 0.5 MG/DL (ref 0.2–1.2)
BUN/CREATININE RATIO: ABNORMAL (CALC) (ref 6–22)
C-REACTIVE PROTEIN: 0.16 MG/DL
CALCIUM: 8.9 MG/DL (ref 8.6–10.4)
CALCIUM: 9 MG/DL (ref 8.6–10.4)
CALCIUM: 9.1 MG/DL (ref 8.6–10.4)
CALCIUM: 9.5 MG/DL (ref 8.6–10.4)
CARBON DIOXIDE: 17 MMOL/L (ref 20–31)
CARBON DIOXIDE: 22 MMOL/L (ref 20–31)
CARBON DIOXIDE: 22 MMOL/L (ref 20–31)
CARBON DIOXIDE: 23 MMOL/L (ref 20–31)
CARBON DIOXIDE: 24 MMOL/L (ref 20–31)
CARBON DIOXIDE: 25 MMOL/L (ref 20–31)
CARBON DIOXIDE: 25 MMOL/L (ref 20–31)
CARBON DIOXIDE: 26 MMOL/L (ref 20–31)
CARBON DIOXIDE: 27 MMOL/L (ref 20–31)
CHLORIDE: 102 MMOL/L (ref 98–110)
CHLORIDE: 103 MMOL/L (ref 98–110)
CHLORIDE: 103 MMOL/L (ref 98–110)
CHLORIDE: 104 MMOL/L (ref 98–110)
CHLORIDE: 105 MMOL/L (ref 98–110)
CHLORIDE: 106 MMOL/L (ref 98–110)
CHLORIDE: 106 MMOL/L (ref 98–110)
CHLORIDE: 107 MMOL/L (ref 98–110)
CRCL (C&G) (MOSAIQ HL): 72.16 ML/MIN
CRCL (C&G) (MOSAIQ HL): 73.38 ML/MIN
CRCL (C&G) (MOSAIQ HL): 76.68 ML/MIN
CRCL (C&G) (MOSAIQ HL): 79.54 ML/MIN
CRCL (C&G) (MOSAIQ HL): 81.82 ML/MIN
CRCL (C&G) (MOSAIQ HL): 83.54 ML/MIN
CRCL (C&G) (MOSAIQ HL): 83.66 ML/MIN
CRCL (C&G) (MOSAIQ HL): 85.08 ML/MIN
CRCL (C&G) (MOSAIQ HL): 85.39 ML/MIN
CRCL (C&G) (MOSAIQ HL): 86.35 ML/MIN
CRCL (C&G) (MOSAIQ HL): 90.17 ML/MIN
CREATININE CLEARANCE (MOSAIQ HL): 64 ML/MIN
CREATININE CLEARANCE (MOSAIQ HL): 68 ML/MIN
CREATININE CLEARANCE (MOSAIQ HL): 71.5 ML/MIN
CREATININE CLEARANCE (MOSAIQ HL): 72.5 ML/MIN
CREATININE CLEARANCE (MOSAIQ HL): 74.4 ML/MIN
CREATININE CLEARANCE (MOSAIQ HL): 74.5 ML/MIN
CREATININE CLEARANCE (MOSAIQ HL): 76.5 ML/MIN
CREATININE CLEARANCE (MOSAIQ HL): 77.6 ML/MIN
CREATININE CLEARANCE (MOSAIQ HL): 79.8 ML/MIN
CREATININE CLEARANCE (MOSAIQ HL): 81 ML/MIN
CREATININE CLEARANCE (MOSAIQ HL): 82.2 ML/MIN
CREATININE: 0.67 MG/DL (ref 0.5–0.99)
CREATININE: 0.68 MG/DL (ref 0.5–0.99)
CREATININE: 0.69 MG/DL (ref 0.5–0.99)
CREATININE: 0.71 MG/DL (ref 0.5–0.99)
CREATININE: 0.72 MG/DL (ref 0.5–0.99)
CREATININE: 0.73 MG/DL (ref 0.5–0.99)
CREATININE: 0.74 MG/DL (ref 0.5–0.99)
CREATININE: 0.75 MG/DL (ref 0.5–0.99)
CREATININE: 0.77 MG/DL (ref 0.5–0.99)
CREATININE: 0.81 MG/DL (ref 0.5–0.99)
CREATININE: 0.86 MG/DL (ref 0.5–0.99)
EGFR AFRICAN AMERICAN: 101 ML/MIN/1.73M2
EGFR AFRICAN AMERICAN: 103 ML/MIN/1.73M2
EGFR AFRICAN AMERICAN: 105 ML/MIN/1.73M2
EGFR AFRICAN AMERICAN: 106 ML/MIN/1.73M2
EGFR AFRICAN AMERICAN: 106 ML/MIN/1.73M2
EGFR AFRICAN AMERICAN: 82 ML/MIN/1.73M2
EGFR AFRICAN AMERICAN: 88 ML/MIN/1.73M2
EGFR AFRICAN AMERICAN: 93 ML/MIN/1.73M2
EGFR AFRICAN AMERICAN: 96 ML/MIN/1.73M2
EGFR AFRICAN AMERICAN: 98 ML/MIN/1.73M2
EGFR AFRICAN AMERICAN: 99 ML/MIN/1.73M2
EGFR NON-AFR. AMERICAN: 70 ML/MIN/1.73M2
EGFR NON-AFR. AMERICAN: 76 ML/MIN/1.73M2
EGFR NON-AFR. AMERICAN: 80 ML/MIN/1.73M2
EGFR NON-AFR. AMERICAN: 82 ML/MIN/1.73M2
EGFR NON-AFR. AMERICAN: 84 ML/MIN/1.73M2
EGFR NON-AFR. AMERICAN: 85 ML/MIN/1.73M2
EGFR NON-AFR. AMERICAN: 87 ML/MIN/1.73M2
EGFR NON-AFR. AMERICAN: 89 ML/MIN/1.73M2
EGFR NON-AFR. AMERICAN: 91 ML/MIN/1.73M2
EGFR NON-AFR. AMERICAN: 91 ML/MIN/1.73M2
EGFR NON-AFR. AMERICAN: 92 ML/MIN/1.73M2
EOS%: 0 %
EOS%: 0.6 %
EOS%: 0.7 %
EOS%: 0.7 %
EOS%: 0.9 %
EOS%: 1 %
EOS%: 1.1 %
EOS%: 1.3 %
EOS%: 10.4 %
EOS%: 10.9 %
EOS%: 11.5 %
EOS%: 11.9 %
EOS%: 12.5 %
EOS%: 12.7 %
EOS%: 13.5 %
EOS%: 14.6 %
EOS%: 14.9 %
EOS%: 15.4 %
EOS%: 15.7 %
EOS%: 15.7 %
EOS%: 16.4 %
EOS%: 16.7 %
EOS%: 18.7 %
EOS%: 18.8 %
EOS%: 18.9 %
EOS%: 19.1 %
EOS%: 19.1 %
EOS%: 19.2 %
EOS%: 19.4 %
EOS%: 19.8 %
EOS%: 19.8 %
EOS%: 2.6 %
EOS%: 2.7 %
EOS%: 2.7 %
EOS%: 2.8 %
EOS%: 20.2 %
EOS%: 20.9 %
EOS%: 22.4 %
EOS%: 24.2 %
EOS%: 24.9 %
EOS%: 25.7 %
EOS%: 3.2 %
EOS%: 3.4 %
EOS%: 3.9 %
EOS%: 35.2 %
EOS%: 4.3 %
EOS%: 5.4 %
EOS%: 6.4 %
EOS%: 6.6 %
EOS%: 7.4 %
EOS%: 7.7 %
EOS%: 8.1 %
EOS%: 8.7 %
EOS%: 9.2 %
EOS%: 9.3 %
EOS%: 9.3 %
EOS: 0 10^3/UL
EOS: 0.1 10^3/UL
EOS: 0.2 10^3/UL
EOS: 0.3 10^3/UL
EOS: 0.31 10^3/UL
EOS: 0.4 10^3/UL
EOS: 0.5 10^3/UL
EOS: 0.6 10^3/UL
EOS: 0.7 10^3/UL
EOS: 0.8 10^3/UL
EOS: 0.8 10^3/UL
EOS: 0.9 10^3/UL
EOS: 1 10^3/UL
EOS: 1.1 10^3/UL
EOS: 1.3 10^3/UL
EOS: 1.8 10^3/UL
EOS: 1.8 10^3/UL
EOS: 2.7 10^3/UL
FREE T4 INDEX (T7): 2.2 (ref 1.4–3.8)
GLOBULIN: 1.5 G/DL (CALC) (ref 1.9–3.7)
GLOBULIN: 1.5 G/DL (CALC) (ref 1.9–3.7)
GLOBULIN: 1.7 G/DL (CALC) (ref 1.9–3.7)
GLOBULIN: 1.7 G/DL (CALC) (ref 1.9–3.7)
GLOBULIN: 1.8 G/DL (CALC) (ref 1.9–3.7)
GLOBULIN: 1.8 G/DL (CALC) (ref 1.9–3.7)
GLOBULIN: 1.9 G/DL (CALC) (ref 1.9–3.7)
GLOBULIN: 2.9 G/DL (CALC) (ref 1.9–3.7)
GLOBULIN: 3.5 G/DL (CALC) (ref 1.9–3.7)
GLUCOSE: 106 MG/DL (ref 65–99)
GLUCOSE: 106 MG/DL (ref 65–99)
GLUCOSE: 118 MG/DL (ref 65–99)
GLUCOSE: 129 MG/DL (ref 65–99)
GLUCOSE: 130 MG/DL (ref 65–99)
GLUCOSE: 131 MG/DL (ref 65–99)
GLUCOSE: 141 MG/DL (ref 65–99)
GLUCOSE: 175 MG/DL (ref 65–99)
GLUCOSE: 185 MG/DL (ref 65–99)
GLUCOSE: 89 MG/DL (ref 65–99)
GLUCOSE: 97 MG/DL (ref 65–99)
HCT: 20.2 % (ref 38–54)
HCT: 21.6 % (ref 38–54)
HCT: 22.3 % (ref 38–54)
HCT: 22.9 % (ref 38–54)
HCT: 23 % (ref 38–54)
HCT: 23.8 % (ref 38–54)
HCT: 24.3 % (ref 38–54)
HCT: 24.4 % (ref 38–54)
HCT: 24.5 % (ref 38–54)
HCT: 24.6 % (ref 38–54)
HCT: 24.6 % (ref 38–54)
HCT: 25.2 % (ref 38–54)
HCT: 25.3 % (ref 38–54)
HCT: 25.3 % (ref 38–54)
HCT: 25.4 % (ref 38–54)
HCT: 25.7 % (ref 38–54)
HCT: 25.9 % (ref 38–54)
HCT: 26.2 % (ref 38–54)
HCT: 26.6 % (ref 38–54)
HCT: 26.9 % (ref 38–54)
HCT: 27 % (ref 38–54)
HCT: 27.2 % (ref 38–54)
HCT: 27.2 % (ref 38–54)
HCT: 27.3 % (ref 38–54)
HCT: 27.4 % (ref 38–54)
HCT: 27.7 % (ref 38–54)
HCT: 28 % (ref 38–54)
HCT: 28.1 %
HCT: 28.1 % (ref 38–54)
HCT: 28.2 % (ref 38–54)
HCT: 28.7 % (ref 38–54)
HCT: 28.7 % (ref 38–54)
HCT: 28.9 % (ref 38–54)
HCT: 29.1 % (ref 38–54)
HCT: 30.1 % (ref 38–54)
HCT: 30.4 % (ref 38–54)
HCT: 31.3 % (ref 38–54)
HCT: 31.4 % (ref 38–54)
HCT: 31.5 % (ref 38–54)
HCT: 31.5 % (ref 38–54)
HCT: 31.6 % (ref 38–54)
HCT: 32.1 % (ref 38–54)
HCT: 32.7 % (ref 38–54)
HCT: 32.8 % (ref 38–54)
HCT: 32.9 % (ref 38–54)
HCT: 33 % (ref 38–54)
HCT: 33.1 % (ref 38–54)
HCT: 33.9 % (ref 38–54)
HCT: 34 % (ref 38–54)
HCT: 34 % (ref 38–54)
HCT: 34.2 % (ref 38–54)
HCT: 34.4 % (ref 38–54)
HCT: 34.6 % (ref 38–54)
HCT: 34.8 % (ref 38–54)
HCT: 35.6 % (ref 38–54)
HCT: 36.5 % (ref 38–54)
HGB: 10.1 G/DL (ref 12–18)
HGB: 10.5 G/DL (ref 12–18)
HGB: 10.5 G/DL (ref 12–18)
HGB: 10.6 G/DL (ref 12–18)
HGB: 10.7 G/DL (ref 12–18)
HGB: 10.8 G/DL (ref 12–18)
HGB: 11.1 G/DL (ref 12–18)
HGB: 11.4 G/DL (ref 12–18)
HGB: 11.5 G/DL (ref 12–18)
HGB: 11.5 G/DL (ref 12–18)
HGB: 11.6 G/DL (ref 12–18)
HGB: 11.6 G/DL (ref 12–18)
HGB: 11.8 G/DL (ref 12–18)
HGB: 12.2 G/DL (ref 12–18)
HGB: 7 G/DL (ref 12–18)
HGB: 7.3 G/DL (ref 12–18)
HGB: 7.6 G/DL (ref 12–18)
HGB: 7.8 G/DL (ref 12–18)
HGB: 7.9 G/DL (ref 12–18)
HGB: 8.1 G/DL (ref 12–18)
HGB: 8.2 G/DL (ref 12–18)
HGB: 8.2 G/DL (ref 12–18)
HGB: 8.4 G/DL (ref 12–18)
HGB: 8.5 G/DL (ref 12–18)
HGB: 8.6 G/DL (ref 12–18)
HGB: 8.7 G/DL (ref 12–18)
HGB: 8.7 G/DL (ref 12–18)
HGB: 8.9 G/DL (ref 12–18)
HGB: 9 G/DL (ref 12–18)
HGB: 9.2 G/DL (ref 12–18)
HGB: 9.3 G/DL (ref 12–18)
HGB: 9.5 G/DL
HGB: 9.6 G/DL (ref 12–18)
HGB: 9.7 G/DL (ref 12–18)
HGB: 9.7 G/DL (ref 12–18)
HGB: 9.8 G/DL (ref 12–18)
HGB: 9.8 G/DL (ref 12–18)
HGB: 9.9 G/DL (ref 12–18)
HGB: 9.9 G/DL (ref 12–18)
LD: 162 UNIT/L (ref 120–250)
LD: 173 UNIT/L (ref 120–250)
LD: 184 UNIT/L (ref 120–250)
LYMPH%: 12.1 % (ref 12–44)
LYMPH%: 13.7 % (ref 12–44)
LYMPH%: 13.9 % (ref 12–44)
LYMPH%: 17.2 % (ref 12–44)
LYMPH%: 24.7 % (ref 12–44)
LYMPH%: 26.9 % (ref 12–44)
LYMPH%: 28.1 % (ref 12–44)
LYMPH%: 31.7 % (ref 12–44)
LYMPH%: 32.9 % (ref 12–44)
LYMPH%: 36.5 % (ref 12–44)
LYMPH%: 37.3 % (ref 12–44)
LYMPH%: 37.5 % (ref 12–44)
LYMPH%: 39 % (ref 12–44)
LYMPH%: 39.9 % (ref 12–44)
LYMPH%: 40.5 % (ref 12–44)
LYMPH%: 40.6 % (ref 12–44)
LYMPH%: 41.8 % (ref 12–44)
LYMPH%: 43.9 % (ref 12–44)
LYMPH%: 45.3 % (ref 12–44)
LYMPH%: 45.5 % (ref 12–44)
LYMPH%: 45.5 % (ref 12–44)
LYMPH%: 45.6 % (ref 12–44)
LYMPH%: 45.6 % (ref 12–44)
LYMPH%: 46.1 % (ref 12–44)
LYMPH%: 46.3 % (ref 12–44)
LYMPH%: 47.2 % (ref 12–44)
LYMPH%: 47.9 % (ref 12–44)
LYMPH%: 48.5 % (ref 12–44)
LYMPH%: 49.6 % (ref 12–44)
LYMPH%: 50.2 % (ref 12–44)
LYMPH%: 52.1 % (ref 12–44)
LYMPH%: 52.3 % (ref 12–44)
LYMPH%: 52.5 % (ref 12–44)
LYMPH%: 52.6 % (ref 12–44)
LYMPH%: 52.9 % (ref 12–44)
LYMPH%: 53.2 % (ref 12–44)
LYMPH%: 55.8 % (ref 12–44)
LYMPH%: 57.6 % (ref 12–44)
LYMPH%: 58.7 % (ref 12–44)
LYMPH%: 58.9 % (ref 12–44)
LYMPH%: 59.3 % (ref 12–44)
LYMPH%: 59.4 % (ref 12–44)
LYMPH%: 60.9 % (ref 12–44)
LYMPH%: 61.8 % (ref 12–44)
LYMPH%: 62.7 % (ref 12–44)
LYMPH%: 62.7 % (ref 12–44)
LYMPH%: 63.2 %
LYMPH%: 63.2 % (ref 12–44)
LYMPH%: 65.1 % (ref 12–44)
LYMPH%: 65.2 % (ref 12–44)
LYMPH%: 71.3 % (ref 12–44)
LYMPH%: 76.1 % (ref 12–44)
LYMPH%: 84.2 % (ref 12–44)
LYMPH: 0.6 10^3/UL (ref 0.8–2.8)
LYMPH: 0.7 10^3/UL (ref 0.8–2.8)
LYMPH: 0.9 10^3/UL (ref 0.8–2.8)
LYMPH: 1 10^3/UL (ref 0.8–2.8)
LYMPH: 1.1 10^3/UL (ref 0.8–2.8)
LYMPH: 1.1 10^3/UL (ref 0.8–2.8)
LYMPH: 1.3 10^3/UL (ref 0.8–2.8)
LYMPH: 1.5 10^3/UL (ref 0.8–2.8)
LYMPH: 1.6 10^3/UL (ref 0.8–2.8)
LYMPH: 1.8 10^3/UL (ref 0.8–2.8)
LYMPH: 1.9 10^3/UL (ref 0.8–2.8)
LYMPH: 2 10^3/UL (ref 0.8–2.8)
LYMPH: 2.1 10^3/UL (ref 0.8–2.8)
LYMPH: 2.11 10^3/UL
LYMPH: 2.2 10^3/UL (ref 0.8–2.8)
LYMPH: 2.3 10^3/UL (ref 0.8–2.8)
LYMPH: 2.4 10^3/UL (ref 0.8–2.8)
LYMPH: 2.4 10^3/UL (ref 0.8–2.8)
LYMPH: 2.5 10^3/UL (ref 0.8–2.8)
LYMPH: 2.6 10^3/UL (ref 0.8–2.8)
LYMPH: 2.7 10^3/UL (ref 0.8–2.8)
LYMPH: 2.9 10^3/UL (ref 0.8–2.8)
LYMPH: 3.1 10^3/UL (ref 0.8–2.8)
LYMPH: 3.2 10^3/UL (ref 0.8–2.8)
LYMPH: 3.3 10^3/UL (ref 0.8–2.8)
LYMPH: 3.4 10^3/UL (ref 0.8–2.8)
LYMPH: 3.6 10^3/UL (ref 0.8–2.8)
LYMPH: 3.9 10^3/UL (ref 0.8–2.8)
LYMPH: 4 10^3/UL (ref 0.8–2.8)
LYMPH: 4.2 10^3/UL (ref 0.8–2.8)
MCH: 29.5 PG (ref 26–33)
MCH: 29.5 PG (ref 26–33)
MCH: 29.7 PG (ref 26–33)
MCH: 30 PG
MCH: 30 PG (ref 26–33)
MCH: 30.1 PG (ref 26–33)
MCH: 30.1 PG (ref 26–33)
MCH: 30.3 PG (ref 26–33)
MCH: 30.4 PG (ref 26–33)
MCH: 30.6 PG (ref 26–33)
MCH: 30.7 PG (ref 26–33)
MCH: 30.7 PG (ref 26–33)
MCH: 30.9 PG (ref 26–33)
MCH: 31 PG (ref 26–33)
MCH: 31.1 PG (ref 26–33)
MCH: 31.1 PG (ref 26–33)
MCH: 31.3 PG (ref 26–33)
MCH: 31.4 PG (ref 26–33)
MCH: 31.7 PG (ref 26–33)
MCH: 31.8 PG (ref 26–33)
MCH: 31.8 PG (ref 26–33)
MCH: 31.9 PG (ref 26–33)
MCH: 31.9 PG (ref 26–33)
MCH: 32.2 PG (ref 26–33)
MCH: 32.3 PG (ref 26–33)
MCH: 32.4 PG (ref 26–33)
MCH: 32.5 PG (ref 26–33)
MCH: 32.7 PG (ref 26–33)
MCH: 32.8 PG (ref 26–33)
MCH: 32.9 PG (ref 26–33)
MCH: 33 PG (ref 26–33)
MCH: 33.1 PG (ref 26–33)
MCH: 33.2 PG (ref 26–33)
MCH: 33.3 PG (ref 26–33)
MCH: 33.3 PG (ref 26–33)
MCH: 33.4 PG (ref 26–33)
MCH: 33.7 PG (ref 26–33)
MCH: 33.7 PG (ref 26–33)
MCH: 34 PG (ref 26–33)
MCH: 34.1 PG (ref 26–33)
MCH: 34.6 PG (ref 26–33)
MCHC: 31.7 G/DL (ref 31–36)
MCHC: 32.1 G/DL (ref 31–36)
MCHC: 32.1 G/DL (ref 31–36)
MCHC: 32.2 G/DL (ref 31–36)
MCHC: 32.3 G/DL (ref 31–36)
MCHC: 32.3 G/DL (ref 31–36)
MCHC: 32.6 G/DL (ref 31–36)
MCHC: 32.9 G/DL (ref 31–36)
MCHC: 33 G/DL (ref 31–36)
MCHC: 33.1 G/DL (ref 31–36)
MCHC: 33.1 G/DL (ref 31–36)
MCHC: 33.2 G/DL (ref 31–36)
MCHC: 33.3 G/DL (ref 31–36)
MCHC: 33.3 G/DL (ref 31–36)
MCHC: 33.4 G/DL (ref 31–36)
MCHC: 33.5 G/DL (ref 31–36)
MCHC: 33.6 G/DL (ref 31–36)
MCHC: 33.7 G/DL (ref 31–36)
MCHC: 33.8 G/DL
MCHC: 33.8 G/DL (ref 31–36)
MCHC: 33.9 G/DL (ref 31–36)
MCHC: 33.9 G/DL (ref 31–36)
MCHC: 34 G/DL (ref 31–36)
MCHC: 34 G/DL (ref 31–36)
MCHC: 34.1 G/DL (ref 31–36)
MCHC: 34.2 G/DL (ref 31–36)
MCHC: 34.3 G/DL (ref 31–36)
MCHC: 34.4 G/DL (ref 31–36)
MCHC: 34.5 G/DL (ref 31–36)
MCHC: 34.7 G/DL (ref 31–36)
MCHC: 35.1 G/DL (ref 31–36)
MCV: 100 FML (ref 82–100)
MCV: 100.3 FML (ref 82–100)
MCV: 100.3 FML (ref 82–100)
MCV: 101.2 FML (ref 82–100)
MCV: 101.5 FML (ref 82–100)
MCV: 101.9 FML (ref 82–100)
MCV: 102.9 FML (ref 82–100)
MCV: 103 FML (ref 82–100)
MCV: 104.5 FML (ref 82–100)
MCV: 85.8 FML (ref 82–100)
MCV: 86.4 FML (ref 82–100)
MCV: 87.4 FML (ref 82–100)
MCV: 87.7 FML (ref 82–100)
MCV: 87.8 FML (ref 82–100)
MCV: 87.9 FML (ref 82–100)
MCV: 88.1 FML (ref 82–100)
MCV: 88.2 FML (ref 82–100)
MCV: 88.5 FML (ref 82–100)
MCV: 88.6 FML
MCV: 88.6 FML (ref 82–100)
MCV: 88.9 FML (ref 82–100)
MCV: 89.1 FML (ref 82–100)
MCV: 89.2 FML (ref 82–100)
MCV: 89.2 FML (ref 82–100)
MCV: 89.4 FML (ref 82–100)
MCV: 89.8 FML (ref 82–100)
MCV: 90 FML (ref 82–100)
MCV: 90.7 FML (ref 82–100)
MCV: 91.4 FML (ref 82–100)
MCV: 91.6 FML (ref 82–100)
MCV: 92.9 FML (ref 82–100)
MCV: 92.9 FML (ref 82–100)
MCV: 94.3 FML (ref 82–100)
MCV: 94.7 FML (ref 82–100)
MCV: 94.8 FML (ref 82–100)
MCV: 96.2 FML (ref 82–100)
MCV: 96.4 FML (ref 82–100)
MCV: 96.8 FML (ref 82–100)
MCV: 97.1 FML (ref 82–100)
MCV: 98.2 FML (ref 82–100)
MCV: 98.3 FML (ref 82–100)
MCV: 98.4 FML (ref 82–100)
MCV: 98.5 FML (ref 82–100)
MCV: 98.6 FML (ref 82–100)
MCV: 98.6 FML (ref 82–100)
MCV: 98.9 FML (ref 82–100)
MCV: 99 FML (ref 82–100)
MCV: 99.1 FML (ref 82–100)
MCV: 99.2 FML (ref 82–100)
MCV: 99.4 FML (ref 82–100)
MCV: 99.6 FML (ref 82–100)
MCV: 99.7 FML (ref 82–100)
MONO%: 1.3 % (ref 2–12)
MONO%: 1.4 % (ref 2–12)
MONO%: 1.5 % (ref 2–12)
MONO%: 10 % (ref 2–12)
MONO%: 10.2 % (ref 2–12)
MONO%: 11.3 % (ref 2–12)
MONO%: 11.4 % (ref 2–12)
MONO%: 11.4 % (ref 2–12)
MONO%: 11.6 % (ref 2–12)
MONO%: 11.7 % (ref 2–12)
MONO%: 11.8 % (ref 2–12)
MONO%: 11.9 % (ref 2–12)
MONO%: 12.6 % (ref 2–12)
MONO%: 12.8 % (ref 2–12)
MONO%: 13.1 % (ref 2–12)
MONO%: 13.7 % (ref 2–12)
MONO%: 14.1 %
MONO%: 14.1 % (ref 2–12)
MONO%: 14.2 % (ref 2–12)
MONO%: 14.4 % (ref 2–12)
MONO%: 14.5 % (ref 2–12)
MONO%: 15.8 % (ref 2–12)
MONO%: 16.1 % (ref 2–12)
MONO%: 16.4 % (ref 2–12)
MONO%: 16.9 % (ref 2–12)
MONO%: 17.1 % (ref 2–12)
MONO%: 17.6 % (ref 2–12)
MONO%: 18.1 % (ref 2–12)
MONO%: 2.3 % (ref 2–12)
MONO%: 2.5 % (ref 2–12)
MONO%: 2.6 % (ref 2–12)
MONO%: 22.2 % (ref 2–12)
MONO%: 3.3 % (ref 2–12)
MONO%: 3.5 % (ref 2–12)
MONO%: 3.7 % (ref 2–12)
MONO%: 4.2 % (ref 2–12)
MONO%: 4.3 % (ref 2–12)
MONO%: 4.7 % (ref 2–12)
MONO%: 5.1 % (ref 2–12)
MONO%: 5.6 % (ref 2–12)
MONO%: 5.7 % (ref 2–12)
MONO%: 5.9 % (ref 2–12)
MONO%: 5.9 % (ref 2–12)
MONO%: 6 % (ref 2–12)
MONO%: 6.1 % (ref 2–12)
MONO%: 6.1 % (ref 2–12)
MONO%: 6.2 % (ref 2–12)
MONO%: 6.8 % (ref 2–12)
MONO%: 7.6 % (ref 2–12)
MONO%: 7.8 % (ref 2–12)
MONO%: 8.5 % (ref 2–12)
MONO%: 8.9 % (ref 2–12)
MONO%: 9.3 % (ref 2–12)
MONO%: 9.4 % (ref 2–12)
MONO%: 9.5 % (ref 2–12)
MONO%: 9.8 % (ref 2–12)
MONO: 0 10^3/UL (ref 0.2–1)
MONO: 0.1 10^3/UL (ref 0.2–1)
MONO: 0.2 10^3/UL (ref 0.2–1)
MONO: 0.3 10^3/UL (ref 0.2–1)
MONO: 0.4 10^3/UL (ref 0.2–1)
MONO: 0.47 10^3/UL
MONO: 0.5 10^3/UL (ref 0.2–1)
MONO: 0.6 10^3/UL (ref 0.2–1)
MONO: 0.7 10^3/UL (ref 0.2–1)
MONO: 0.8 10^3/UL (ref 0.2–1)
MONO: 0.9 10^3/UL (ref 0.2–1)
MONO: 1 10^3/UL (ref 0.2–1)
MONO: 1.1 10^3/UL (ref 0.2–1)
MONO: 1.1 10^3/UL (ref 0.2–1)
MONO: 1.2 10^3/UL (ref 0.2–1)
MONO: 1.2 10^3/UL (ref 0.2–1)
MPV: 10.1 FML (ref 8.6–11.7)
MPV: 10.2 FML
MPV: 10.2 FML (ref 8.6–11.7)
MPV: 10.2 FML (ref 8.6–11.7)
MPV: 10.3 FML (ref 8.6–11.7)
MPV: 10.3 FML (ref 8.6–11.7)
MPV: 10.5 FML (ref 8.6–11.7)
MPV: 10.9 FML (ref 8.6–11.7)
MPV: 11.1 FML (ref 8.6–11.7)
MPV: 11.1 FML (ref 8.6–11.7)
MPV: 11.4 FML (ref 8.6–11.7)
MPV: 12.3 FML (ref 8.6–11.7)
MPV: 8.3 FML (ref 8.6–11.7)
MPV: 8.6 FML (ref 8.6–11.7)
MPV: 8.8 FML (ref 8.6–11.7)
MPV: 8.8 FML (ref 8.6–11.7)
MPV: 9 FML (ref 8.6–11.7)
MPV: 9.2 FML (ref 8.6–11.7)
MPV: 9.3 FML (ref 8.6–11.7)
MPV: 9.3 FML (ref 8.6–11.7)
MPV: 9.4 FML (ref 8.6–11.7)
MPV: 9.5 FML (ref 8.6–11.7)
MPV: 9.5 FML (ref 8.6–11.7)
MPV: 9.7 FML (ref 8.6–11.7)
MPV: 9.7 FML (ref 8.6–11.7)
MPV: 9.8 FML (ref 8.6–11.7)
MPV: 9.8 FML (ref 8.6–11.7)
NEUT%: 1.2 % (ref 47–76)
NEUT%: 12 %
NEUT%: 12.2 % (ref 47–76)
NEUT%: 13.1 % (ref 47–76)
NEUT%: 13.3 % (ref 47–76)
NEUT%: 13.6 % (ref 47–76)
NEUT%: 13.7 % (ref 47–76)
NEUT%: 15.1 % (ref 47–76)
NEUT%: 15.5 % (ref 47–76)
NEUT%: 15.8 % (ref 47–76)
NEUT%: 16 % (ref 47–76)
NEUT%: 16.6 % (ref 47–76)
NEUT%: 18.7 % (ref 47–76)
NEUT%: 18.9 % (ref 47–76)
NEUT%: 19.1 % (ref 47–76)
NEUT%: 19.1 % (ref 47–76)
NEUT%: 19.2 % (ref 47–76)
NEUT%: 19.8 % (ref 47–76)
NEUT%: 20 % (ref 47–76)
NEUT%: 20.9 % (ref 47–76)
NEUT%: 21.2 % (ref 47–76)
NEUT%: 22.6 % (ref 47–76)
NEUT%: 23.6 % (ref 47–76)
NEUT%: 23.7 % (ref 47–76)
NEUT%: 23.9 % (ref 47–76)
NEUT%: 24.5 % (ref 47–76)
NEUT%: 25.5 % (ref 47–76)
NEUT%: 25.9 % (ref 47–76)
NEUT%: 26.7 % (ref 47–76)
NEUT%: 26.9 % (ref 47–76)
NEUT%: 27.3 % (ref 47–76)
NEUT%: 27.4 % (ref 47–76)
NEUT%: 28 % (ref 47–76)
NEUT%: 28.3 % (ref 47–76)
NEUT%: 28.8 % (ref 47–76)
NEUT%: 29.3 % (ref 47–76)
NEUT%: 29.5 % (ref 47–76)
NEUT%: 29.8 % (ref 47–76)
NEUT%: 30.8 % (ref 47–76)
NEUT%: 32.3 % (ref 47–76)
NEUT%: 37.6 % (ref 47–76)
NEUT%: 37.9 % (ref 47–76)
NEUT%: 47.8 % (ref 47–76)
NEUT%: 50.5 % (ref 47–76)
NEUT%: 51.1 % (ref 47–76)
NEUT%: 64 % (ref 47–76)
NEUT%: 77.2 % (ref 47–76)
NEUT%: 8.2 % (ref 47–76)
NEUT%: 80.6 % (ref 47–76)
NEUT: 0.1 10^3/UL (ref 1.5–7.1)
NEUT: 0.3 10^3/UL (ref 1.5–7.1)
NEUT: 0.4 10^3/UL
NEUT: 0.4 10^3/UL (ref 1.5–7.1)
NEUT: 0.4 10^3/UL (ref 1.5–7.1)
NEUT: 0.5 10^3/UL (ref 1.5–7.1)
NEUT: 0.6 10^3/UL (ref 1.5–7.1)
NEUT: 0.7 10^3/UL (ref 1.5–7.1)
NEUT: 0.8 10^3/UL (ref 1.5–7.1)
NEUT: 0.9 10^3/UL (ref 1.5–7.1)
NEUT: 0.9 10^3/UL (ref 1.5–7.1)
NEUT: 1 10^3/UL (ref 1.5–7.1)
NEUT: 1 10^3/UL (ref 1.5–7.1)
NEUT: 1.1 10^3/UL (ref 1.5–7.1)
NEUT: 1.2 10^3/UL (ref 1.5–7.1)
NEUT: 1.3 10^3/UL (ref 1.5–7.1)
NEUT: 1.4 10^3/UL (ref 1.5–7.1)
NEUT: 1.5 10^3/UL (ref 1.5–7.1)
NEUT: 1.6 10^3/UL (ref 1.5–7.1)
NEUT: 1.6 10^3/UL (ref 1.5–7.1)
NEUT: 1.8 10^3/UL (ref 1.5–7.1)
NEUT: 1.8 10^3/UL (ref 1.5–7.1)
NEUT: 1.9 10^3/UL (ref 1.5–7.1)
NEUT: 1.9 10^3/UL (ref 1.5–7.1)
NEUT: 2.1 10^3/UL (ref 1.5–7.1)
NEUT: 2.1 10^3/UL (ref 1.5–7.1)
NEUT: 2.2 10^3/UL (ref 1.5–7.1)
NEUT: 2.6 10^3/UL (ref 1.5–7.1)
NEUT: 2.7 10^3/UL (ref 1.5–7.1)
NEUT: 2.8 10^3/UL (ref 1.5–7.1)
NEUT: 4.2 10^3/UL (ref 1.5–7.1)
NEUT: 4.3 10^3/UL (ref 1.5–7.1)
PLT: 10 10^3/UL (ref 150–375)
PLT: 111 10^3/UL (ref 150–375)
PLT: 114 10^3/UL (ref 150–375)
PLT: 12 10^3/UL (ref 150–375)
PLT: 12 10^3/UL (ref 150–375)
PLT: 13 10^3/UL (ref 150–375)
PLT: 15 10^3/UL (ref 150–375)
PLT: 15 10^3/UL (ref 150–375)
PLT: 16 10^3/UL (ref 150–375)
PLT: 17 10^3/UL (ref 150–375)
PLT: 17 10^3/UL (ref 150–375)
PLT: 177 10^3/UL (ref 150–375)
PLT: 19 10^3/UL (ref 150–375)
PLT: 21 10^3/UL (ref 150–375)
PLT: 21 10^3/UL (ref 150–375)
PLT: 22 10^3/UL (ref 150–375)
PLT: 22 10^3/UL (ref 150–375)
PLT: 23 10^3/UL (ref 150–375)
PLT: 237 10^3/UL (ref 150–375)
PLT: 24 10^3/UL (ref 150–375)
PLT: 25 10^3/UL (ref 150–375)
PLT: 25 10^3/UL (ref 150–375)
PLT: 27 10^3/UL (ref 150–375)
PLT: 29 10^3/UL (ref 150–375)
PLT: 3 10^3/UL (ref 150–375)
PLT: 31 10^3/UL (ref 150–375)
PLT: 32 10^3/UL (ref 150–375)
PLT: 33 10^3/UL (ref 150–375)
PLT: 33 10^3/UL (ref 150–375)
PLT: 35 10^3/UL (ref 150–375)
PLT: 37 10^3/UL
PLT: 37 10^3/UL (ref 150–375)
PLT: 41 10^3/UL (ref 150–375)
PLT: 41 10^3/UL (ref 150–375)
PLT: 47 10^3/UL (ref 150–375)
PLT: 47 10^3/UL (ref 150–375)
PLT: 50 10^3/UL (ref 150–375)
PLT: 51 10^3/UL (ref 150–375)
PLT: 51 10^3/UL (ref 150–375)
PLT: 56 10^3/UL
PLT: 59 10^3/UL (ref 150–375)
PLT: 62 10^3/UL (ref 150–375)
PLT: 70 10^3/UL (ref 150–375)
PLT: 74 10^3/UL (ref 150–375)
PLT: 78 10^3/UL (ref 150–375)
PLT: 8 10^3/UL (ref 150–375)
PLT: 80 10^3/UL (ref 150–375)
PLT: 85 10^3/UL (ref 150–375)
PLT: 9 10^3/UL (ref 150–375)
PLT: 92 10^3/UL (ref 150–375)
PLT: 97 10^3/UL (ref 150–375)
POTASSIUM: 3.4 MMOL/L (ref 3.5–5.3)
POTASSIUM: 3.4 MMOL/L (ref 3.5–5.3)
POTASSIUM: 3.6 MMOL/L (ref 3.5–5.3)
POTASSIUM: 3.7 MMOL/L (ref 3.5–5.3)
POTASSIUM: 3.7 MMOL/L (ref 3.5–5.3)
POTASSIUM: 3.8 MMOL/L (ref 3.5–5.3)
POTASSIUM: 3.9 MMOL/L (ref 3.5–5.3)
POTASSIUM: 3.9 MMOL/L (ref 3.5–5.3)
POTASSIUM: 4 MMOL/L (ref 3.5–5.3)
PROTEIN, TOTAL: 5.7 G/DL (ref 6.1–8.1)
PROTEIN, TOTAL: 5.9 G/DL (ref 6.1–8.1)
PROTEIN, TOTAL: 5.9 G/DL (ref 6.1–8.1)
PROTEIN, TOTAL: 6 G/DL (ref 6.1–8.1)
PROTEIN, TOTAL: 6 G/DL (ref 6.1–8.1)
PROTEIN, TOTAL: 6.1 G/DL (ref 6.1–8.1)
PROTEIN, TOTAL: 6.3 G/DL (ref 6.1–8.1)
PROTEIN, TOTAL: 6.9 G/DL (ref 6.1–8.1)
PROTEIN, TOTAL: 7.5 G/DL (ref 6.1–8.1)
RBC: 2.31 10^6/UL (ref 4.2–6.2)
RBC: 2.43 10^6/UL (ref 4.2–6.2)
RBC: 2.46 10^6/UL (ref 4.2–6.2)
RBC: 2.49 10^6/UL (ref 4.2–6.2)
RBC: 2.51 10^6/UL (ref 4.2–6.2)
RBC: 2.53 10^6/UL (ref 4.2–6.2)
RBC: 2.55 10^6/UL (ref 4.2–6.2)
RBC: 2.55 10^6/UL (ref 4.2–6.2)
RBC: 2.57 10^6/UL (ref 4.2–6.2)
RBC: 2.58 10^6/UL (ref 4.2–6.2)
RBC: 2.61 10^6/UL (ref 4.2–6.2)
RBC: 2.64 10^6/UL (ref 4.2–6.2)
RBC: 2.64 10^6/UL (ref 4.2–6.2)
RBC: 2.68 10^6/UL (ref 4.2–6.2)
RBC: 2.69 10^6/UL (ref 4.2–6.2)
RBC: 2.74 10^6/UL (ref 4.2–6.2)
RBC: 2.79 10^6/UL (ref 4.2–6.2)
RBC: 2.81 10^6/UL (ref 4.2–6.2)
RBC: 2.82 10^6/UL (ref 4.2–6.2)
RBC: 2.83 10^6/UL (ref 4.2–6.2)
RBC: 2.83 10^6/UL (ref 4.2–6.2)
RBC: 2.86 10^6/UL (ref 4.2–6.2)
RBC: 2.86 10^6/UL (ref 4.2–6.2)
RBC: 2.89 10^6/UL (ref 4.2–6.2)
RBC: 2.9 10^6/UL (ref 4.2–6.2)
RBC: 2.91 10^6/UL (ref 4.2–6.2)
RBC: 2.92 10^6/UL (ref 4.2–6.2)
RBC: 2.94 10^6/UL (ref 4.2–6.2)
RBC: 2.94 10^6/UL (ref 4.2–6.2)
RBC: 3.02 10^6/UL (ref 4.2–6.2)
RBC: 3.06 10^6/UL (ref 4.2–6.2)
RBC: 3.07 10^6/UL (ref 4.2–6.2)
RBC: 3.1 10^6/UL (ref 4.2–6.2)
RBC: 3.13 10^6/UL (ref 4.2–6.2)
RBC: 3.17 10^6/UL
RBC: 3.22 10^6/UL (ref 4.2–6.2)
RBC: 3.23 10^6/UL (ref 4.2–6.2)
RBC: 3.24 10^6/UL (ref 4.2–6.2)
RBC: 3.26 10^6/UL (ref 4.2–6.2)
RBC: 3.26 10^6/UL (ref 4.2–6.2)
RBC: 3.31 10^6/UL (ref 4.2–6.2)
RBC: 3.32 10^6/UL (ref 4.2–6.2)
RBC: 3.32 10^6/UL (ref 4.2–6.2)
RBC: 3.38 10^6/UL (ref 4.2–6.2)
RBC: 3.41 10^6/UL (ref 4.2–6.2)
RBC: 3.49 10^6/UL (ref 4.2–6.2)
RBC: 3.5 10^6/UL (ref 4.2–6.2)
RBC: 3.52 10^6/UL (ref 4.2–6.2)
RBC: 3.53 10^6/UL (ref 4.2–6.2)
RBC: 3.53 10^6/UL (ref 4.2–6.2)
RBC: 3.66 10^6/UL (ref 4.2–6.2)
RBC: 3.74 10^6/UL (ref 4.2–6.2)
RBC: 3.75 10^6/UL (ref 4.2–6.2)
RBC: 3.76 10^6/UL (ref 4.2–6.2)
RBC: 3.76 10^6/UL (ref 4.2–6.2)
RBC: 3.83 10^6/UL (ref 4.2–6.2)
RDW-CV: 15.8 %
RDW-CV: 16.1 %
RDW-CV: 16.3 %
RDW-CV: 16.9 %
RDW-CV: 16.9 %
RDW-CV: 17.3 %
RDW-CV: 17.3 %
RDW-CV: 17.4 %
RDW-CV: 17.5 %
RDW-CV: 17.6 %
RDW-CV: 17.7 %
RDW-CV: 17.8 %
RDW-CV: 17.9 %
RDW-CV: 18 %
RDW-CV: 18.4 %
RDW-CV: 18.5 %
RDW-CV: 18.5 %
RDW-CV: 18.8 %
RDW-CV: 19.2 %
RDW-CV: 19.2 %
RDW-CV: 19.4 %
RDW-CV: 19.4 %
RDW-CV: 19.6 %
RDW-CV: 20 %
RDW-CV: 20.3 %
RDW-CV: 20.5 %
RDW-CV: 21.1 %
RDW-CV: 21.4 %
RDW-CV: 21.6 %
RDW-CV: 21.6 %
RDW-CV: 21.8 %
RDW-CV: 23 %
RDW-CV: 23 %
RDW-CV: 23.1 %
RDW-CV: 23.6 %
RDW-CV: 23.6 %
RDW-CV: 24.2 %
RDW-CV: 24.3 %
RDW-CV: 24.5 %
RDW-CV: 24.6 %
RDW-CV: 24.6 %
RDW-CV: 24.7 %
RDW-CV: 24.8 %
RDW-CV: 25 %
RDW-CV: 25 %
RDW-CV: 25.4 %
RDW-SD: 54.2 FML (ref 36–50)
RDW-SD: 54.5 FML (ref 36–50)
RDW-SD: 54.6 FML (ref 36–50)
RDW-SD: 54.9 FML (ref 36–50)
RDW-SD: 55.1 FML (ref 36–50)
RDW-SD: 55.3 FML (ref 36–50)
RDW-SD: 55.6 FML (ref 36–50)
RDW-SD: 55.6 FML (ref 36–50)
RDW-SD: 55.7 FML (ref 36–50)
RDW-SD: 55.7 FML (ref 36–50)
RDW-SD: 56.1 FML (ref 36–50)
RDW-SD: 56.5 FML (ref 36–50)
RDW-SD: 56.6 FML (ref 36–50)
RDW-SD: 56.7 FML (ref 36–50)
RDW-SD: 56.8 FML (ref 36–50)
RDW-SD: 56.9 FML (ref 36–50)
RDW-SD: 57.2 FML (ref 36–50)
RDW-SD: 57.4 FML (ref 36–50)
RDW-SD: 57.5 FML (ref 36–50)
RDW-SD: 57.6 FML (ref 36–50)
RDW-SD: 57.7 FML (ref 36–50)
RDW-SD: 57.9 FML (ref 36–50)
RDW-SD: 57.9 FML (ref 36–50)
RDW-SD: 58 FML (ref 36–50)
RDW-SD: 58.1 FML (ref 36–50)
RDW-SD: 58.3 FML (ref 36–50)
RDW-SD: 58.3 FML (ref 36–50)
RDW-SD: 58.7 FML (ref 36–50)
RDW-SD: 58.8 FML (ref 36–50)
RDW-SD: 59.3 FML (ref 36–50)
RDW-SD: 59.4 FML (ref 36–50)
RDW-SD: 59.5 FML (ref 36–50)
RDW-SD: 60.8 FML (ref 36–50)
RDW-SD: 60.9 FML (ref 36–50)
RDW-SD: 62.3 FML (ref 36–50)
RDW-SD: 66.4 FML (ref 36–50)
RDW-SD: 71 FML (ref 36–50)
RDW-SD: 71.8 FML (ref 36–50)
RDW-SD: 73.4 FML (ref 36–50)
RDW-SD: 74.7 FML (ref 36–50)
RDW-SD: 75 FML (ref 36–50)
RDW-SD: 76.7 FML (ref 36–50)
RDW-SD: 76.9 FML (ref 36–50)
RDW-SD: 77.6 FML (ref 36–50)
RDW-SD: 77.8 FML (ref 36–50)
RDW-SD: 79.2 FML (ref 36–50)
RDW-SD: 80.1 FML (ref 36–50)
RDW-SD: 80.4 FML (ref 36–50)
RDW-SD: 80.8 FML (ref 36–50)
RDW-SD: 81.4 FML (ref 36–50)
RDW-SD: 81.8 FML (ref 36–50)
RDW-SD: 82.2 FML (ref 36–50)
RDW-SD: 83.1 FML (ref 36–50)
RDW-SD: 85.2 FML (ref 36–50)
RDW-SD: 86.2 FML (ref 36–50)
RETICULOCYTE COUNT,$AUTOMATED: 1 %
RETICULOCYTE, ABSOLUTE: NORMAL CELLS/UL (ref 20000–80000)
RHEUMATOID FACTOR: 9 IU/ML
SED RATE BY MODIFIED$WESTERGRE: 2 MM/H
SED RATE BY MODIFIED$WESTERGRE: 2 MM/H
SODIUM: 138 MMOL/L (ref 135–146)
SODIUM: 138 MMOL/L (ref 135–146)
SODIUM: 139 MMOL/L (ref 135–146)
SODIUM: 140 MMOL/L (ref 135–146)
SODIUM: 141 MMOL/L (ref 135–146)
SODIUM: 142 MMOL/L (ref 135–146)
SODIUM: 142 MMOL/L (ref 135–146)
SODIUM: 143 MMOL/L (ref 135–146)
SODIUM: 144 MMOL/L (ref 135–146)
T-3 UPTAKE: 31 % (ref 22–35)
T-4 (THYROXINE), TOTAL: 7.2 MCG/DL (ref 4.5–12)
TSH, 3RD GENERATION: 1.62 MIU/L (ref 0.4–4.5)
UREA NITROGEN (BUN): 15 MG/DL (ref 7–25)
UREA NITROGEN (BUN): 15 MG/DL (ref 7–25)
UREA NITROGEN (BUN): 16 MG/DL (ref 7–25)
UREA NITROGEN (BUN): 17 MG/DL (ref 7–25)
UREA NITROGEN (BUN): 18 MG/DL (ref 7–25)
UREA NITROGEN (BUN): 18 MG/DL (ref 7–25)
UREA NITROGEN (BUN): 21 MG/DL (ref 7–25)
UREA NITROGEN (BUN): 21 MG/DL (ref 7–25)
UREA NITROGEN (BUN): 22 MG/DL (ref 7–25)
URIC ACID: 5.7 MG/DL (ref 2.5–7)
WBC: 1.9 10^3/UL (ref 4.3–11)
WBC: 2 10^3/UL (ref 4.3–11)
WBC: 2.4 10^3/UL (ref 4.3–11)
WBC: 2.7 10^3/UL (ref 4.3–11)
WBC: 2.9 10^3/UL (ref 4.3–11)
WBC: 3 10^3/UL (ref 4.3–11)
WBC: 3 10^3/UL (ref 4.3–11)
WBC: 3.2 10^3/UL (ref 4.3–11)
WBC: 3.3 10^3/UL (ref 4.3–11)
WBC: 3.34 10^3/UL
WBC: 3.4 10^3/UL (ref 4.3–11)
WBC: 3.4 10^3/UL (ref 4.3–11)
WBC: 3.6 10^3/UL (ref 4.3–11)
WBC: 3.7 10^3/UL (ref 4.3–11)
WBC: 3.7 10^3/UL (ref 4.3–11)
WBC: 3.9 10^3/UL (ref 4.3–11)
WBC: 4 10^3/UL (ref 4.3–11)
WBC: 4.1 10^3/UL (ref 4.3–11)
WBC: 4.3 10^3/UL (ref 4.3–11)
WBC: 4.4 10^3/UL (ref 4.3–11)
WBC: 4.4 10^3/UL (ref 4.3–11)
WBC: 4.6 10^3/UL (ref 4.3–11)
WBC: 4.7 10^3/UL (ref 4.3–11)
WBC: 4.8 10^3/UL (ref 4.3–11)
WBC: 4.9 10^3/UL (ref 4.3–11)
WBC: 5 10^3/UL (ref 4.3–11)
WBC: 5 10^3/UL (ref 4.3–11)
WBC: 5.1 10^3/UL (ref 4.3–11)
WBC: 5.3 10^3/UL (ref 4.3–11)
WBC: 5.4 10^3/UL (ref 4.3–11)
WBC: 5.4 10^3/UL (ref 4.3–11)
WBC: 5.5 10^3/UL (ref 4.3–11)
WBC: 5.6 10^3/UL (ref 4.3–11)
WBC: 5.6 10^3/UL (ref 4.3–11)
WBC: 5.7 10^3/UL (ref 4.3–11)
WBC: 5.7 10^3/UL (ref 4.3–11)
WBC: 6 10^3/UL (ref 4.3–11)
WBC: 6.1 10^3/UL (ref 4.3–11)
WBC: 6.2 10^3/UL (ref 4.3–11)
WBC: 6.8 10^3/UL (ref 4.3–11)
WBC: 6.8 10^3/UL (ref 4.3–11)
WBC: 7.1 10^3/UL (ref 4.3–11)
WBC: 7.2 10^3/UL (ref 4.3–11)
WBC: 7.5 10^3/UL (ref 4.3–11)
WBC: 7.6 10^3/UL (ref 4.3–11)
WBC: 7.8 10^3/UL (ref 4.3–11)
WBC: 8.5 10^3/UL (ref 4.3–11)
WBC: 8.7 10^3/UL (ref 4.3–11)
WBC: 9.2 10^3/UL (ref 4.3–11)
WBC: 9.4 10^3/UL (ref 4.3–11)
WBC: 9.6 10^3/UL (ref 4.3–11)

## 2020-10-11 VITALS — SYSTOLIC BLOOD PRESSURE: 128 MMHG | DIASTOLIC BLOOD PRESSURE: 58 MMHG | WEIGHT: 152.01 LBS

## 2020-10-11 VITALS
HEIGHT: 63 IN | DIASTOLIC BLOOD PRESSURE: 64 MMHG | SYSTOLIC BLOOD PRESSURE: 108 MMHG | BODY MASS INDEX: 25.87 KG/M2 | WEIGHT: 146.01 LBS

## 2020-10-11 VITALS — DIASTOLIC BLOOD PRESSURE: 64 MMHG | WEIGHT: 156.99 LBS | SYSTOLIC BLOOD PRESSURE: 106 MMHG

## 2020-10-11 VITALS — SYSTOLIC BLOOD PRESSURE: 108 MMHG | DIASTOLIC BLOOD PRESSURE: 60 MMHG | WEIGHT: 156.2 LBS

## 2020-10-11 VITALS — DIASTOLIC BLOOD PRESSURE: 68 MMHG | SYSTOLIC BLOOD PRESSURE: 108 MMHG | WEIGHT: 152.8 LBS

## 2020-10-11 VITALS — SYSTOLIC BLOOD PRESSURE: 112 MMHG | DIASTOLIC BLOOD PRESSURE: 66 MMHG | WEIGHT: 153 LBS

## 2020-10-11 VITALS — DIASTOLIC BLOOD PRESSURE: 58 MMHG | SYSTOLIC BLOOD PRESSURE: 112 MMHG | WEIGHT: 152.6 LBS

## 2020-10-11 VITALS
HEIGHT: 60 IN | SYSTOLIC BLOOD PRESSURE: 118 MMHG | WEIGHT: 144.49 LBS | BODY MASS INDEX: 28.37 KG/M2 | DIASTOLIC BLOOD PRESSURE: 74 MMHG

## 2020-10-11 VITALS — TEMPERATURE: 98.4 F | DIASTOLIC BLOOD PRESSURE: 64 MMHG | SYSTOLIC BLOOD PRESSURE: 118 MMHG | WEIGHT: 149.01 LBS

## 2020-10-11 VITALS
DIASTOLIC BLOOD PRESSURE: 62 MMHG | WEIGHT: 151.19 LBS | SYSTOLIC BLOOD PRESSURE: 112 MMHG | WEIGHT: 150 LBS | DIASTOLIC BLOOD PRESSURE: 58 MMHG | SYSTOLIC BLOOD PRESSURE: 110 MMHG

## 2020-10-11 VITALS — WEIGHT: 155.01 LBS | DIASTOLIC BLOOD PRESSURE: 58 MMHG | WEIGHT: 156.59 LBS | SYSTOLIC BLOOD PRESSURE: 112 MMHG

## 2020-10-11 VITALS — WEIGHT: 152.6 LBS | SYSTOLIC BLOOD PRESSURE: 118 MMHG | DIASTOLIC BLOOD PRESSURE: 58 MMHG

## 2020-10-11 VITALS — SYSTOLIC BLOOD PRESSURE: 114 MMHG | DIASTOLIC BLOOD PRESSURE: 56 MMHG | WEIGHT: 156 LBS

## 2020-10-11 VITALS — SYSTOLIC BLOOD PRESSURE: 118 MMHG | WEIGHT: 156 LBS | DIASTOLIC BLOOD PRESSURE: 64 MMHG

## 2020-10-11 VITALS — SYSTOLIC BLOOD PRESSURE: 112 MMHG | WEIGHT: 155.01 LBS | DIASTOLIC BLOOD PRESSURE: 66 MMHG

## 2020-10-11 VITALS — SYSTOLIC BLOOD PRESSURE: 118 MMHG | DIASTOLIC BLOOD PRESSURE: 68 MMHG | WEIGHT: 159 LBS

## 2021-01-01 ENCOUNTER — EXTERNAL RECORD (OUTPATIENT)
Dept: INFUSION THERAPY | Age: 71
End: 2021-01-01

## 2021-03-03 DIAGNOSIS — Z23 NEED FOR VACCINATION: ICD-10-CM

## 2024-07-22 NOTE — TELEPHONE ENCOUNTER
----- Message from JESUSITA Rodriguez sent at 7/22/2024  8:16 AM CDT -----  Sodium, potassium, magnesium normal.  Kidney function normal.  NTproBNP mildly elevated from previous.    If assessment stable, no changes.  Follow up as planned.   Spoke with Jose Dukes at Dr. Julio César Mendoza office per pt request. He is her Oncologist. LM that Dr. Madeleine Stoner is recommending a breast biopsy, \"area likely a papilloma\". She will pass along information.

## (undated) NOTE — MR AVS SNAPSHOT
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   Contact both your physi discharge instructions in [employer] by going to Visits < Admission Summaries. If you've been to the Emergency Department or your doctor's office, you can view your past visit information in [employer] by going to Visits < Visit Summaries. Volley questions?

## (undated) NOTE — ED AVS SNAPSHOT
[facility] Emergency Department    [place]  [address] [address]    Phone:  [phone]    Fax:  [phone]   MRN: [id number]    Department:  [facility] Emergency Department   Date of Visit:  6 Insurance plans vary and the physician(s) referred by the ER may not be covered by your plan. Please contact your insurance company to determine coverage for follow-up care and referrals.     [facility]  Main [phone]  Pediatric If the emergency physician has read X-rays, these will be re-interpreted by a radiologist.  If there is a significant change in your reading, you will be contacted. Please make sure we have your correct phone number before you leave.  After you leave, you s and ask to get set up for an insurance coverage that is in-network with [place] [id number].         Imaging Results         US VENOUS DOPPLER LEG BILAT - DIAG IMG (CPT=93970) (Final result) Result time:  06/10/17 08:46:30    Final result    Impression: Approved by: Danisha Brower MD                    MyChart     Visit [employer]  You can access your MyChart to more actively manage your health care and view more details from this visit by going to [email or web address].   If you've recently had a

## (undated) NOTE — MR AVS SNAPSHOT
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   Procedure                               Abnormality         Status                     ---------                               -------

## (undated) NOTE — MR AVS SNAPSHOT
After Visit Summary   2/6/2017    Leonor Mittal    MRN: [id number]           Diagnoses this Visit     Idiopathic thrombocytopenia purpura ([facility]    -  Primary       Allergies     Animal Dander [Dander]     Cats : Asthma   Dogs : gets a cold

## (undated) NOTE — MR AVS SNAPSHOT
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          Result Summary for BLOOD TYPE, ABO AND RH D      Component Results     Component    ABO BLOOD TYPE    A    RH BLOOD TYPE    Positive         Result Summary for ANTIBODY SCREEN      [place]

## (undated) NOTE — ED AVS SNAPSHOT
[facility] Emergency Department    [place]  [address] [address]    Phone:  [phone]    Fax:  [phone]   MRN: [id number]    Department:  [facility] Emergency Department   Date of Visit:  6 IF THERE IS ANY CHANGE OR WORSENING OF YOUR CONDITION, CALL YOUR PRIMARY CARE PHYSICIAN AT ONCE OR RETURN IMMEDIATELY TO THE EMERGENCY DEPARTMENT.     If you have been prescribed any medication(s), please fill your prescription right away and begin taking t

## (undated) NOTE — MR AVS SNAPSHOT
After Visit Summary   2/7/2017    Luna Mahoney    MRN: [id number]           Diagnoses this Visit     Idiopathic thrombocytopenia purpura ([facility]    -  Primary       Allergies     Animal Dander [Dander]     Cats : Asthma   Dogs : gets a cold